# Patient Record
Sex: MALE | Race: WHITE | NOT HISPANIC OR LATINO | URBAN - METROPOLITAN AREA
[De-identification: names, ages, dates, MRNs, and addresses within clinical notes are randomized per-mention and may not be internally consistent; named-entity substitution may affect disease eponyms.]

---

## 2022-02-21 ENCOUNTER — INPATIENT (INPATIENT)
Facility: HOSPITAL | Age: 37
LOS: 3 days | Discharge: HOME | DRG: 897 | End: 2022-02-25
Attending: STUDENT IN AN ORGANIZED HEALTH CARE EDUCATION/TRAINING PROGRAM | Admitting: STUDENT IN AN ORGANIZED HEALTH CARE EDUCATION/TRAINING PROGRAM
Payer: SELF-PAY

## 2022-02-21 VITALS
HEART RATE: 149 BPM | RESPIRATION RATE: 18 BRPM | DIASTOLIC BLOOD PRESSURE: 96 MMHG | SYSTOLIC BLOOD PRESSURE: 159 MMHG | TEMPERATURE: 99 F | WEIGHT: 139.99 LBS | HEIGHT: 69 IN | OXYGEN SATURATION: 98 %

## 2022-02-21 LAB
ALBUMIN SERPL ELPH-MCNC: 4.2 G/DL — SIGNIFICANT CHANGE UP (ref 3.3–5)
ALBUMIN SERPL ELPH-MCNC: 4.2 G/DL — SIGNIFICANT CHANGE UP (ref 3.4–5)
ALP SERPL-CCNC: 280 U/L — HIGH (ref 40–120)
ALP SERPL-CCNC: 346 U/L — HIGH (ref 40–120)
ALT FLD-CCNC: 104 U/L — HIGH (ref 10–45)
ALT FLD-CCNC: 151 U/L — HIGH (ref 12–42)
AMPHET UR-MCNC: NEGATIVE — SIGNIFICANT CHANGE UP
ANION GAP SERPL CALC-SCNC: 16 MMOL/L — SIGNIFICANT CHANGE UP (ref 5–17)
ANION GAP SERPL CALC-SCNC: 24 MMOL/L — HIGH (ref 9–16)
APPEARANCE UR: CLEAR — SIGNIFICANT CHANGE UP
APTT BLD: 33.4 SEC — SIGNIFICANT CHANGE UP (ref 27.5–35.5)
AST SERPL-CCNC: 182 U/L — HIGH (ref 15–37)
AST SERPL-CCNC: 196 U/L — HIGH (ref 10–40)
B-OH-BUTYR SERPL-SCNC: 2.8 MMOL/L — HIGH
BACTERIA # UR AUTO: PRESENT /HPF
BARBITURATES UR SCN-MCNC: NEGATIVE — SIGNIFICANT CHANGE UP
BASOPHILS # BLD AUTO: 0.09 K/UL — SIGNIFICANT CHANGE UP (ref 0–0.2)
BASOPHILS NFR BLD AUTO: 0.5 % — SIGNIFICANT CHANGE UP (ref 0–2)
BENZODIAZ UR-MCNC: NEGATIVE — SIGNIFICANT CHANGE UP
BILIRUB SERPL-MCNC: 1.5 MG/DL — HIGH (ref 0.2–1.2)
BILIRUB SERPL-MCNC: 1.6 MG/DL — HIGH (ref 0.2–1.2)
BILIRUB UR-MCNC: NEGATIVE — SIGNIFICANT CHANGE UP
BUN SERPL-MCNC: 13 MG/DL — SIGNIFICANT CHANGE UP (ref 7–23)
BUN SERPL-MCNC: 8 MG/DL — SIGNIFICANT CHANGE UP (ref 7–23)
CALCIUM SERPL-MCNC: 8.1 MG/DL — LOW (ref 8.4–10.5)
CALCIUM SERPL-MCNC: 9.3 MG/DL — SIGNIFICANT CHANGE UP (ref 8.5–10.5)
CHLORIDE SERPL-SCNC: 94 MMOL/L — LOW (ref 96–108)
CHLORIDE SERPL-SCNC: 96 MMOL/L — SIGNIFICANT CHANGE UP (ref 96–108)
CO2 SERPL-SCNC: 18 MMOL/L — LOW (ref 22–31)
CO2 SERPL-SCNC: 24 MMOL/L — SIGNIFICANT CHANGE UP (ref 22–31)
COCAINE METAB.OTHER UR-MCNC: NEGATIVE — SIGNIFICANT CHANGE UP
COLOR SPEC: YELLOW — SIGNIFICANT CHANGE UP
CREAT SERPL-MCNC: 0.5 MG/DL — SIGNIFICANT CHANGE UP (ref 0.5–1.3)
CREAT SERPL-MCNC: 1 MG/DL — SIGNIFICANT CHANGE UP (ref 0.5–1.3)
DIFF PNL FLD: ABNORMAL
EOSINOPHIL # BLD AUTO: 0 K/UL — SIGNIFICANT CHANGE UP (ref 0–0.5)
EOSINOPHIL NFR BLD AUTO: 0 % — SIGNIFICANT CHANGE UP (ref 0–6)
EPI CELLS # UR: SIGNIFICANT CHANGE UP /HPF (ref 0–5)
ETHANOL SERPL-MCNC: 3 MG/DL — SIGNIFICANT CHANGE UP
GLUCOSE BLDC GLUCOMTR-MCNC: 111 MG/DL — HIGH (ref 70–99)
GLUCOSE SERPL-MCNC: 59 MG/DL — LOW (ref 70–99)
GLUCOSE SERPL-MCNC: 84 MG/DL — SIGNIFICANT CHANGE UP (ref 70–99)
GLUCOSE UR QL: NEGATIVE — SIGNIFICANT CHANGE UP
HCT VFR BLD CALC: 41.5 % — SIGNIFICANT CHANGE UP (ref 39–50)
HGB BLD-MCNC: 14.2 G/DL — SIGNIFICANT CHANGE UP (ref 13–17)
HYALINE CASTS # UR AUTO: ABNORMAL /LPF (ref 0–2)
IMM GRANULOCYTES NFR BLD AUTO: 1.6 % — HIGH (ref 0–1.5)
INR BLD: 1.01 — SIGNIFICANT CHANGE UP (ref 0.88–1.16)
KETONES UR-MCNC: 40 MG/DL
LACTATE SERPL-SCNC: 2.4 MMOL/L — HIGH (ref 0.5–2)
LEUKOCYTE ESTERASE UR-ACNC: NEGATIVE — SIGNIFICANT CHANGE UP
LIDOCAIN IGE QN: 418 U/L — HIGH (ref 73–393)
LYMPHOCYTES # BLD AUTO: 1.28 K/UL — SIGNIFICANT CHANGE UP (ref 1–3.3)
LYMPHOCYTES # BLD AUTO: 7.8 % — LOW (ref 13–44)
MAGNESIUM SERPL-MCNC: 2.2 MG/DL — SIGNIFICANT CHANGE UP (ref 1.6–2.6)
MAGNESIUM SERPL-MCNC: 3 MG/DL — HIGH (ref 1.6–2.6)
MCHC RBC-ENTMCNC: 32.2 PG — SIGNIFICANT CHANGE UP (ref 27–34)
MCHC RBC-ENTMCNC: 34.2 GM/DL — SIGNIFICANT CHANGE UP (ref 32–36)
MCV RBC AUTO: 94.1 FL — SIGNIFICANT CHANGE UP (ref 80–100)
METHADONE UR-MCNC: NEGATIVE — SIGNIFICANT CHANGE UP
MONOCYTES # BLD AUTO: 1.28 K/UL — HIGH (ref 0–0.9)
MONOCYTES NFR BLD AUTO: 7.8 % — SIGNIFICANT CHANGE UP (ref 2–14)
NEUTROPHILS # BLD AUTO: 13.5 K/UL — HIGH (ref 1.8–7.4)
NEUTROPHILS NFR BLD AUTO: 82.3 % — HIGH (ref 43–77)
NITRITE UR-MCNC: NEGATIVE — SIGNIFICANT CHANGE UP
NRBC # BLD: 0 /100 WBCS — SIGNIFICANT CHANGE UP (ref 0–0)
OPIATES UR-MCNC: NEGATIVE — SIGNIFICANT CHANGE UP
PCP SPEC-MCNC: SIGNIFICANT CHANGE UP
PCP UR-MCNC: NEGATIVE — SIGNIFICANT CHANGE UP
PH UR: 6 — SIGNIFICANT CHANGE UP (ref 5–8)
PHOSPHATE SERPL-MCNC: 3.1 MG/DL — SIGNIFICANT CHANGE UP (ref 2.5–4.5)
PLATELET # BLD AUTO: 191 K/UL — SIGNIFICANT CHANGE UP (ref 150–400)
POTASSIUM SERPL-MCNC: 3 MMOL/L — LOW (ref 3.5–5.3)
POTASSIUM SERPL-MCNC: 3.8 MMOL/L — SIGNIFICANT CHANGE UP (ref 3.5–5.3)
POTASSIUM SERPL-SCNC: 3 MMOL/L — LOW (ref 3.5–5.3)
POTASSIUM SERPL-SCNC: 3.8 MMOL/L — SIGNIFICANT CHANGE UP (ref 3.5–5.3)
PROT SERPL-MCNC: 6.2 G/DL — SIGNIFICANT CHANGE UP (ref 6–8.3)
PROT SERPL-MCNC: 8 G/DL — SIGNIFICANT CHANGE UP (ref 6.4–8.2)
PROT UR-MCNC: NEGATIVE MG/DL — SIGNIFICANT CHANGE UP
PROTHROM AB SERPL-ACNC: 11.9 SEC — SIGNIFICANT CHANGE UP (ref 10.6–13.6)
RBC # BLD: 4.41 M/UL — SIGNIFICANT CHANGE UP (ref 4.2–5.8)
RBC # FLD: 19.7 % — HIGH (ref 10.3–14.5)
RBC CASTS # UR COMP ASSIST: > 10 /HPF
SARS-COV-2 RNA SPEC QL NAA+PROBE: SIGNIFICANT CHANGE UP
SODIUM SERPL-SCNC: 136 MMOL/L — SIGNIFICANT CHANGE UP (ref 132–145)
SODIUM SERPL-SCNC: 136 MMOL/L — SIGNIFICANT CHANGE UP (ref 135–145)
SP GR SPEC: 1.02 — SIGNIFICANT CHANGE UP (ref 1–1.03)
THC UR QL: NEGATIVE — SIGNIFICANT CHANGE UP
UROBILINOGEN FLD QL: 0.2 E.U./DL — SIGNIFICANT CHANGE UP
WBC # BLD: 16.42 K/UL — HIGH (ref 3.8–10.5)
WBC # FLD AUTO: 16.42 K/UL — HIGH (ref 3.8–10.5)
WBC UR QL: < 5 /HPF — SIGNIFICANT CHANGE UP

## 2022-02-21 PROCEDURE — 99291 CRITICAL CARE FIRST HOUR: CPT

## 2022-02-21 PROCEDURE — 93010 ELECTROCARDIOGRAM REPORT: CPT

## 2022-02-21 PROCEDURE — 71045 X-RAY EXAM CHEST 1 VIEW: CPT | Mod: 26

## 2022-02-21 PROCEDURE — 99292 CRITICAL CARE ADDL 30 MIN: CPT

## 2022-02-21 PROCEDURE — 99291 CRITICAL CARE FIRST HOUR: CPT | Mod: 25

## 2022-02-21 RX ORDER — FOLIC ACID 0.8 MG
1 TABLET ORAL ONCE
Refills: 0 | Status: COMPLETED | OUTPATIENT
Start: 2022-02-21 | End: 2022-02-21

## 2022-02-21 RX ORDER — SODIUM CHLORIDE 9 MG/ML
1000 INJECTION INTRAMUSCULAR; INTRAVENOUS; SUBCUTANEOUS ONCE
Refills: 0 | Status: COMPLETED | OUTPATIENT
Start: 2022-02-21 | End: 2022-02-21

## 2022-02-21 RX ORDER — POTASSIUM CHLORIDE 20 MEQ
40 PACKET (EA) ORAL ONCE
Refills: 0 | Status: COMPLETED | OUTPATIENT
Start: 2022-02-21 | End: 2022-02-21

## 2022-02-21 RX ORDER — CHLORHEXIDINE GLUCONATE 213 G/1000ML
1 SOLUTION TOPICAL
Refills: 0 | Status: DISCONTINUED | OUTPATIENT
Start: 2022-02-21 | End: 2022-02-22

## 2022-02-21 RX ORDER — FOLIC ACID 0.8 MG
1 TABLET ORAL DAILY
Refills: 0 | Status: DISCONTINUED | OUTPATIENT
Start: 2022-02-22 | End: 2022-02-25

## 2022-02-21 RX ORDER — DIAZEPAM 5 MG
10 TABLET ORAL ONCE
Refills: 0 | Status: DISCONTINUED | OUTPATIENT
Start: 2022-02-21 | End: 2022-02-21

## 2022-02-21 RX ORDER — SODIUM CHLORIDE 9 MG/ML
1000 INJECTION, SOLUTION INTRAVENOUS
Refills: 0 | Status: DISCONTINUED | OUTPATIENT
Start: 2022-02-21 | End: 2022-02-21

## 2022-02-21 RX ORDER — POTASSIUM CHLORIDE 20 MEQ
10 PACKET (EA) ORAL
Refills: 0 | Status: COMPLETED | OUTPATIENT
Start: 2022-02-21 | End: 2022-02-21

## 2022-02-21 RX ORDER — KETOROLAC TROMETHAMINE 30 MG/ML
15 SYRINGE (ML) INJECTION ONCE
Refills: 0 | Status: DISCONTINUED | OUTPATIENT
Start: 2022-02-21 | End: 2022-02-21

## 2022-02-21 RX ORDER — CALCIUM GLUCONATE 100 MG/ML
1 VIAL (ML) INTRAVENOUS ONCE
Refills: 0 | Status: COMPLETED | OUTPATIENT
Start: 2022-02-21 | End: 2022-02-21

## 2022-02-21 RX ORDER — PHENOBARBITAL 60 MG
130 TABLET ORAL
Refills: 0 | Status: DISCONTINUED | OUTPATIENT
Start: 2022-02-21 | End: 2022-02-21

## 2022-02-21 RX ORDER — SODIUM CHLORIDE 9 MG/ML
1000 INJECTION, SOLUTION INTRAVENOUS ONCE
Refills: 0 | Status: COMPLETED | OUTPATIENT
Start: 2022-02-21 | End: 2022-02-21

## 2022-02-21 RX ORDER — THIAMINE MONONITRATE (VIT B1) 100 MG
100 TABLET ORAL ONCE
Refills: 0 | Status: COMPLETED | OUTPATIENT
Start: 2022-02-21 | End: 2022-02-21

## 2022-02-21 RX ORDER — PHENOBARBITAL 60 MG
130 TABLET ORAL ONCE
Refills: 0 | Status: DISCONTINUED | OUTPATIENT
Start: 2022-02-21 | End: 2022-02-21

## 2022-02-21 RX ORDER — PHENOBARBITAL 60 MG
150 TABLET ORAL ONCE
Refills: 0 | Status: DISCONTINUED | OUTPATIENT
Start: 2022-02-21 | End: 2022-02-21

## 2022-02-21 RX ORDER — SODIUM CHLORIDE 9 MG/ML
1000 INJECTION, SOLUTION INTRAVENOUS
Refills: 0 | Status: DISCONTINUED | OUTPATIENT
Start: 2022-02-21 | End: 2022-02-22

## 2022-02-21 RX ORDER — THIAMINE MONONITRATE (VIT B1) 100 MG
500 TABLET ORAL EVERY 8 HOURS
Refills: 0 | Status: COMPLETED | OUTPATIENT
Start: 2022-02-21 | End: 2022-02-24

## 2022-02-21 RX ORDER — PHENOBARBITAL 60 MG
260 TABLET ORAL ONCE
Refills: 0 | Status: DISCONTINUED | OUTPATIENT
Start: 2022-02-21 | End: 2022-02-21

## 2022-02-21 RX ADMIN — SODIUM CHLORIDE 1000 MILLILITER(S): 9 INJECTION, SOLUTION INTRAVENOUS at 13:02

## 2022-02-21 RX ADMIN — Medication 50 MILLIGRAM(S): at 12:51

## 2022-02-21 RX ADMIN — SODIUM CHLORIDE 1000 MILLILITER(S): 9 INJECTION INTRAMUSCULAR; INTRAVENOUS; SUBCUTANEOUS at 11:56

## 2022-02-21 RX ADMIN — SODIUM CHLORIDE 1000 MILLILITER(S): 9 INJECTION, SOLUTION INTRAVENOUS at 12:57

## 2022-02-21 RX ADMIN — Medication 100 MILLIGRAM(S): at 12:00

## 2022-02-21 RX ADMIN — SODIUM CHLORIDE 100 MILLILITER(S): 9 INJECTION, SOLUTION INTRAVENOUS at 16:04

## 2022-02-21 RX ADMIN — Medication 10 MILLIGRAM(S): at 12:15

## 2022-02-21 RX ADMIN — Medication 130 MILLIGRAM(S): at 16:02

## 2022-02-21 RX ADMIN — Medication 150 MILLIGRAM(S): at 23:48

## 2022-02-21 RX ADMIN — Medication 105 MILLIGRAM(S): at 17:58

## 2022-02-21 RX ADMIN — Medication 100 MILLIEQUIVALENT(S): at 22:59

## 2022-02-21 RX ADMIN — Medication 130 MILLIGRAM(S): at 17:00

## 2022-02-21 RX ADMIN — Medication 40 MILLIEQUIVALENT(S): at 23:03

## 2022-02-21 RX ADMIN — Medication 130 MILLIGRAM(S): at 21:46

## 2022-02-21 RX ADMIN — Medication 15 MILLIGRAM(S): at 13:19

## 2022-02-21 RX ADMIN — Medication 100 MILLIEQUIVALENT(S): at 22:03

## 2022-02-21 RX ADMIN — Medication 130 MILLIGRAM(S): at 19:18

## 2022-02-21 RX ADMIN — Medication 10 MILLIGRAM(S): at 13:59

## 2022-02-21 RX ADMIN — Medication 104 MILLIGRAM(S): at 14:01

## 2022-02-21 RX ADMIN — Medication 10 MILLIGRAM(S): at 13:08

## 2022-02-21 RX ADMIN — SODIUM CHLORIDE 1000 MILLILITER(S): 9 INJECTION INTRAMUSCULAR; INTRAVENOUS; SUBCUTANEOUS at 12:02

## 2022-02-21 RX ADMIN — Medication 100 GRAM(S): at 23:54

## 2022-02-21 RX ADMIN — Medication 100 MILLIEQUIVALENT(S): at 20:58

## 2022-02-21 RX ADMIN — Medication 105 MILLIGRAM(S): at 23:03

## 2022-02-21 RX ADMIN — Medication 10 MILLIGRAM(S): at 11:56

## 2022-02-21 RX ADMIN — Medication 1 TABLET(S): at 11:57

## 2022-02-21 RX ADMIN — Medication 10 MILLIGRAM(S): at 12:50

## 2022-02-21 RX ADMIN — SODIUM CHLORIDE 1000 MILLILITER(S): 9 INJECTION INTRAMUSCULAR; INTRAVENOUS; SUBCUTANEOUS at 12:48

## 2022-02-21 RX ADMIN — Medication 1 MILLIGRAM(S): at 11:58

## 2022-02-21 RX ADMIN — SODIUM CHLORIDE 1000 MILLILITER(S): 9 INJECTION INTRAMUSCULAR; INTRAVENOUS; SUBCUTANEOUS at 12:10

## 2022-02-21 NOTE — ED ADULT NURSE NOTE - OBJECTIVE STATEMENT
35 y/o male who was brought in by ambulance for AMS, alcohol withdrawal- Pt states his last drink was 2 days ago and has been walking around the city " lost" pt admits to drinking alcohol everyday about 6-7 vodka and OJ drinks a day with some wine occasionally. Pt arrives with tremors at rest, diaphoretic with visual hallucinations- Pt is awake and alert but disoriented to time- pt presents with no obvious injury or trauma- Bilateral leg swelling noted L>R- 2 Large Bore IV's in place, blood and nasal swab sent- Pt on cardiac monitor and will monitor closely.

## 2022-02-21 NOTE — ED PROVIDER NOTE - CLINICAL SUMMARY MEDICAL DECISION MAKING FREE TEXT BOX
37 y/o M presents to ED with alcohol withdrawal, with initial CIWA of 21.  Pt is very trembulous, diaphoretic.  + sinus tach 140's, 's/90's, afebrile.  Labs are notable for WBC 16, anion gap of 24, transaminitis.  Findings are most consistent with alcohol withdrawal and dehydration.  Pt hydrated with IVFS and given Valium 20 mg IV. 37 y/o M presents to ED with alcohol withdrawal, with initial CIWA of 21.  Pt is very tremulous, diaphoretic.  + sinus tach 140's, 's/90's, afebrile.  Labs are notable for WBC 16, anion gap of 24, transaminitis.  Findings are most consistent with alcohol withdrawal and dehydration.  Pt hydrated with IVFS and given Valium 20 mg IV. 37 y/o M presents to ED with alcohol withdrawal, with initial CIWA of 21.  Pt is very tremulous, diaphoretic.  + sinus tach 140's, 's/90's, afebrile.  Labs are notable for WBC 16, anion gap of 24, transaminitis.  Findings are most consistent with alcohol ketoacidosis and dehydration.  There is no concern for infection at this time.  Pt hydrated with IVFS and given Valium 20 mg IV with improved CIWA 16.

## 2022-02-21 NOTE — H&P ADULT - HISTORY OF PRESENT ILLNESS
37 y/o M, PMH of alcohol abuse (no history of seizures or admissions for WD) presents to ED via EMS c/o tremors and visual hallucinations in the setting of alcohol withdrawal.  He reports seeing things crawling/moving on the floor but he knows they are not there.  He denies headache or tactile disturbances.  He endorses nausea and intermittent vomiting. His last alcoholic drink was approx 24 hours ago (Sunday PM). He reports having experienced tremors in the setting of ETOH withdrawal in the past but never this severe.  He estimates drinking 6-7 Vodka based cocktails daily and sometimes also drinks wine with it, he has drank this much for "years". He denies drug use. Pt denies trauma/falls, fevers/chills, neck or back pain, headache, sore throat, chest pain, palpitations, cough, SOB, abd pain, diarrhea, dysuria, hematuria, weakness, dizziness, numbness, lower extremity swelling, rash, sick contacts, recent hospitalizations.  Pt works as a travel pharmacy tech.   37 y/o M, PMH of alcohol abuse (no history of seizures or admissions for WD) presents to ED via EMS c/o tremors and visual hallucinations in the setting of alcohol withdrawal.  He reports seeing things crawling/moving on the floor but he knows they are not there.  He denies headache or tactile disturbances, although this is no longer occurring upon arrival to [facility].  He endorses nausea and intermittent vomiting. His last alcoholic drink was approx 24 hours ago (Sunday PM). He reports having experienced tremors in the setting of ETOH withdrawal in the past but never this severe.  He estimates drinking 6-7 Vodka based cocktails daily and sometimes also drinks wine with it, he has drank this much for "years". He denies drug use. Pt denies trauma/falls, fevers/chills, neck or back pain, headache, sore throat, chest pain, palpitations, cough, SOB, abd pain, diarrhea, dysuria, hematuria, weakness, dizziness, numbness, lower extremity swelling, rash, sick contacts, recent hospitalizations.  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

## 2022-02-21 NOTE — ED PROVIDER NOTE - CRITICAL CARE ATTENDING CONTRIBUTION TO CARE
The patient was seen immediately upon arrival due to a high probability of imminent or life-threatening deterioration secondary to alcohol withdrawal, requiring IV benzos, regular monitoring, which required my direct attention, intervention, and personal management at the bedside. I have personally provided critical care time exclusive of time spent on separately billable procedures. Time includes review of laboratory data, radiology results, discussion with consultants, discussion with the patient's family, and monitoring for potential decompensation.    Pt presents w severe alcohol withdrawal symptoms, tremors, visual hallucinations. Treated w benzos, fluids aggressively. Consulted MICU for admission, rec to add phenobarb if not significantly improved.

## 2022-02-21 NOTE — ED ADULT TRIAGE NOTE - CHIEF COMPLAINT QUOTE
BIBA for alcohol withdrawals, last drink 2 days ago. reports drinking 4-6 drinks daily (vodka with orange juice). denies HA, N/V, chest pain. h/o alcohol abuse. reports having no history of alcohol withdrawal seizure but has been in withdrawal in the past. +tremulous in triage. HR 130s and FS 64 in the field

## 2022-02-21 NOTE — H&P ADULT - ATTENDING COMMENTS
35 yo M w/ ETOH abuse, hx of tremors with cessation but never required hospital admission for withdrawal, has gone 2-3 days without drinking presented in acute withdrawal (tremors, anxiety, hallucinations, tachycardia, diaphoresis) with CIWA > 20 on presentation to ED. Received valium 10mg IV x 3 and still with CIWA 16 per discussion with ER PA. Decision made to transfer to [facility] ICU for further management of withdrawal. Received additional valium 10mg IV x 2 and librium 50mg oral followed by phenobarbital 260mg IV loading dose in ED prior to transfer. Continue with phenobarbital 130mg q15 min PRN for withdrawal, high dose thiamine, adequately fluid resuscitated at this time. AGMA 2/2 alcoholic ketoacidosis with + BHB. Transaminitis likely related to ETOH, continue to trend.

## 2022-02-21 NOTE — H&P ADULT - NSHPPHYSICALEXAM_GEN_ALL_CORE
VITAL SIGNS:  T(C): 36.7 (02-21-22 @ 14:10), Max: 37.1 (02-21-22 @ 11:30)  T(F): 98 (02-21-22 @ 14:10), Max: 98.7 (02-21-22 @ 11:30)  HR: 111 (02-21-22 @ 14:10) (110 - 149)  BP: 135/87 (02-21-22 @ 14:10) (120/74 - 159/96)  BP(mean): --  RR: 18 (02-21-22 @ 14:10) (18 - 19)  SpO2: 98% (02-21-22 @ 14:10) (97% - 98%)  Wt(kg): --    PHYSICAL EXAM:    Constitutional: nervous young man lying in bed   Head: NC/AT  Eyes: PERRL, EOMI, anicteric sclera  ENT: MMM  Neck: supple; no JVD or thyromegaly  Respiratory: CTA B/L; no W/R/R, no retractions  Cardiac: +S1/S2; RR with tachycardia; no M/R/G; PMI non-displaced  Gastrointestinal: soft, NT/ND; no rebound or guarding; +BSx4  Back: patient complains of MSK pain in the right lower back that comes and goes for months now   Extremities: WWP, no clubbing or cyanosis; no peripheral edema  Vascular: 2+ radial, femoral, DP/PT pulses B/L  Dermatologic: skin warm, dry and intact; no rashes, wounds, or scars  Lymphatic: no submandibular or cervical LAD  Neurologic: AAOx2 (wrong year/month), moves all extremities on command   CIWA: 10-12 for anxiety, tremor and disorientation

## 2022-02-21 NOTE — ED ADULT NURSE NOTE - NSIMPLEMENTINTERV_GEN_ALL_ED
Implemented All Fall with Harm Risk Interventions:  [place] to call system. Call bell, personal items and telephone within reach. Instruct patient to call for assistance. Room bathroom lighting operational. Non-slip footwear when patient is off stretcher. Physically safe environment: no spills, clutter or unnecessary equipment. Stretcher in lowest position, wheels locked, appropriate side rails in place. Provide visual cue, wrist band, yellow gown, etc. Monitor gait and stability. Monitor for mental status changes and reorient to person, place, and time. Review medications for side effects contributing to fall risk. Reinforce activity limits and safety measures with patient and family. Provide visual clues: red socks.

## 2022-02-21 NOTE — H&P ADULT - ASSESSMENT
37 y/o M, PMH of alcohol abuse (no history of seizures or admissions) admitted to the MICU for severe alcohol withdrawal with a CIWA score of >20 on presentation currently on CIWA protocol.     Neuro  #Alcohol WD   - patient got 260mg [facility]bit[facility], 50[facility]   - CIWA protocol, ativan 2mg for CIWA's greater than 8   - Phenobarbital 130mg IV [id number] with a target CIWA less than 8.. avoid benzos during this phase   - c/w high dose thiamine IV 500mg q8hrs (2/21-2/23), followed by PO thiamine to start 2/24   - c/w folate 1g PO daily and multivitamins     Resp  Stable     Cardio  Stable     GI  #Acute alcoholic hepatitis   - Elevated Alk Phos, ALT, AST on admission i/s/o heavy alcohol abuse   - trend as the patient continues CIWA protocol   - Elevated bilirubin on admission     Renal  #AGMA  - likely 2/2 alcoholic induced ketosis, f/u BHB  - trend with fluid restoration, kidney function intact     Heme  Stable     ID  #Leukocytosis   - Patient met SIRS criteria on presentation for a wbc >12K and HR >90, infectios etiology is not suspected   - trend SIRS markers     Misc:  F: D5NS 100cc/hr   E: replete prn   N: NPO  DVT PPX: none    GI PPX: none   Dispo: MICU

## 2022-02-21 NOTE — H&P ADULT - NSHPLABSRESULTS_GEN_ALL_CORE
LABS:                         14.2   16.42 )-----------( 191      ( 2022 11:45 )             41.5         136  |  94<L>  |  13  ----------------------------<  59<L>  3.8   |  18<L>  |  1.00    Ca    9.3      2022 11:45  Mg     3.0         TPro  8.0  /  Alb  4.2  /  TBili  1.6<H>  /  DBili  x   /  AST  182<H>  /  ALT  151<H>  /  AlkPhos  346<H>      PT/INR - ( 2022 11:45 )   PT: 11.9 sec;   INR: 1.01          PTT - ( 2022 11:45 )  PTT:33.4 sec  Urinalysis Basic - ( 2022 13:10 )    Color: Yellow / Appearance: Clear / S.025 / pH: x  Gluc: x / Ketone: 40 mg/dL  / Bili: NEGATIVE / Urobili: 0.2 E.U./dL   Blood: x / Protein: NEGATIVE mg/dL / Nitrite: NEGATIVE   Leuk Esterase: NEGATIVE / RBC: > 10 /HPF / WBC < 5 /HPF   Sq Epi: x / Non Sq Epi: 0-5 /HPF / Bacteria: Present /HPF                RADIOLOGY, EKG & ADDITIONAL TESTS: Reviewed.

## 2022-02-21 NOTE — ED PROVIDER NOTE - PROGRESS NOTE DETAILS
CIWA gradually trending downward.  Now 11.  Pt reports feeling a little better but he remains moderately tremulous and diaphoretic.  HR improved, now 110-120's sinus tach.  Pt given juice for glucose of 59.  Rpt glucose 111.  He has voided with urine continence after IV hydration. Pt given Valium 50 mg total.  Phenobarbital IV loading dose also ordered.  Last CIWA prior to leaving department is 6.

## 2022-02-21 NOTE — PATIENT PROFILE ADULT - FALL HARM RISK - HARM RISK INTERVENTIONS
Assistance with ambulation/Assistance OOB with selected safe patient handling equipment/Communicate Risk of Fall with Harm to all staff/Monitor for mental status changes/Monitor gait and stability/Reinforce activity limits and safety measures with patient and family/Tailored Fall Risk Interventions/Toileting schedule using arm’s reach rule for commode and bathroom/Use of alarms - bed, chair and/or voice tab/Visual Cue: Yellow wristband and red socks/Bed in lowest position, wheels locked, appropriate side rails in place/Call bell, personal items and telephone in reach/Instruct patient to call for assistance before getting out of bed or chair/Non-slip footwear when patient is out of bed/[place] to call system/Physically safe environment - no spills, clutter or unnecessary equipment/Purposeful Proactive Rounding/Room/bathroom lighting operational, light cord in reach

## 2022-02-21 NOTE — ED PROVIDER NOTE - NS ED ROS FT
Constitutional:  no fever, no chills  Eyes:  no discharge, no irritations, no pain, no redness, visual changes  Ears:  no ear pain, no ear drainage,  no hearing problems  Nose:  no nasal congestion, no nasal drainage  Mouth/Throat:  no hoarseness and no throat pain  Neck:  no stiffness, no pain, no lumps, no swollen glands  Cardiac:  no chest pain, no edema  Respiratory:  no cough, no shortness of breath  GI: no abdominal pain, no bloating, no constipation, no diarrhea, + n/v  :  no dysuria, no urinary frequency, no hematuria, no discharge  MSK:  no back pain, no msk pain, no weakness  Neuro:  no headache, no weakness, no numbness, + tremor  Skin:  no lesions, no pruritis, no jaundice, no bruising, no rash  Psych:  no known mental health issues  Endocrine:  no diabetes, no thyroid issues

## 2022-02-21 NOTE — ED PROVIDER NOTE - OBJECTIVE STATEMENT
37 y/o M, PMH of alcohol abuse, PSH appendectomy presents to ED via EMS c/o with c/o tremors and visual hallucinations in the setting of alcohol withdrawal.  His last alcoholic drink was approx less than 2 days ago but pt seems uncertain.  He reports having experienced tremors in the setting of ETOH withdrawal in the past but never this severe.  He estimates drinking 6-7 Vodka based cocktails daily and sometimes also drinks wine with it.  He endorses nausea and vomiting    Pt denies trauma/falls, fevers/chills, neck or back pain, headache, sore throat, chest pain, palpitations, cough, SOB, abd pain, diarrhea, dysuria, hematuria, weakness, dizziness, numbness, lower extremity swelling, rash, sick contacts, recent hospitalizations, recent travels. 37 y/o M, PMH of alcohol abuse, PSH appendectomy presents to ED via EMS c/o with c/o tremors and visual hallucinations in the setting of alcohol withdrawal.  He reports seeing things crawling/moving on the floor but he knows they are not there.  He denies headache or tactile disturbances.  He endorses nausea and intermittent vomiting.  His last alcoholic drink was approx less than 2 days ago but pt seems uncertain.  He reports having experienced tremors in the setting of ETOH withdrawal in the past but never this severe.  He estimates drinking 6-7 Vodka based cocktails daily and sometimes also drinks wine with it. He denies drug use.  He denies any prior ED visits or admissions due to alcohol withdrawal and denies prior withdrawal seizures.    Pt denies trauma/falls, fevers/chills, neck or back pain, headache, sore throat, chest pain, palpitations, cough, SOB, abd pain, diarrhea, dysuria, hematuria, weakness, dizziness, numbness, lower extremity swelling, rash, sick contacts, recent hospitalizations.  Pt works as a travel pharmacy tech.

## 2022-02-21 NOTE — ED PROVIDER NOTE - PHYSICAL EXAMINATION
Constitutional:  Well appearing, disheveled, awake, alert, oriented to person, place, disoriented to time (he believes its June 2022) and in no apparent distress,   Head:  NC/AT, symmetric, neck supple  ENMT: Airway patent, nasal mucosa clear, mouth with normal mucosa, throat has no vesicles, no oropharyngeal exudates and vulva is midline  Eyes:  Clear bilaterally, pupils equal, round and reactive to light  Cardiac:  +tachycardia 140's, regular rhythm. Heart sounds S1,S2.  No murmurs, rubs or gallops.  No JVD.  Respiratory:  Breath sounds clear and equal bilaterally  GI:   Abd soft, non-distended, non-tender, no guarding  MSK:  Spine appears normal, range of motion is not limited, no muscle or joint tenderness  Neuro:  Alert and oriented to person and place, no focal deficits, no motor or sensory deficits  Skin:  Skin appears flushed, warm, , diaphoretic and intact.    Psych:  Normal mood and affect, no apparent risk to self or others.  Heme:  No adenopathy or splenomegaly. Constitutional:  Well appearing, disheveled, awake, alert, oriented to person, place, disoriented to time (he believes its June 2022) and in no apparent distress,   Head:  NC/AT, symmetric, neck supple  ENMT: Airway patent, nasal mucosa clear, mouth with dryl mucosa, throat has no vesicles, no oropharyngeal exudates and vulva is midline  Eyes:  Clear bilaterally, pupils equal, round and reactive to light  Cardiac:  +tachycardia 140's, regular rhythm. Heart sounds S1,S2.  No murmurs, rubs or gallops.  No JVD.  Respiratory:  Breath sounds clear and equal bilaterally  GI:   Abd soft, non-distended, non-tender, no guarding  MSK:  Spine appears normal, range of motion is not limited, no muscle or joint tenderness  Neuro:  Alert and oriented to person and place, disoriented to time, endorses visual disturbances, no tactile disturbances, moderate visible tremor and tongue vesiculation  Skin:  Skin appears flushed, warm, , diaphoretic and intact.    Psych:  Normal mood and affect, no apparent risk to self or others.  Heme:  No adenopathy or splenomegaly.

## 2022-02-22 DIAGNOSIS — F10.239 ALCOHOL DEPENDENCE WITH WITHDRAWAL, UNSPECIFIED: ICD-10-CM

## 2022-02-22 DIAGNOSIS — Z29.9 ENCOUNTER FOR PROPHYLACTIC MEASURES, UNSPECIFIED: ICD-10-CM

## 2022-02-22 DIAGNOSIS — K70.10 ALCOHOLIC HEPATITIS WITHOUT ASCITES: ICD-10-CM

## 2022-02-22 LAB
ALBUMIN SERPL ELPH-MCNC: 3.9 G/DL — SIGNIFICANT CHANGE UP (ref 3.3–5)
ALP SERPL-CCNC: 260 U/L — HIGH (ref 40–120)
ALT FLD-CCNC: 95 U/L — HIGH (ref 10–45)
ANION GAP SERPL CALC-SCNC: 12 MMOL/L — SIGNIFICANT CHANGE UP (ref 5–17)
ANISOCYTOSIS BLD QL: SIGNIFICANT CHANGE UP
AST SERPL-CCNC: 222 U/L — HIGH (ref 10–40)
BASOPHILS # BLD AUTO: 0 K/UL — SIGNIFICANT CHANGE UP (ref 0–0.2)
BASOPHILS NFR BLD AUTO: 0 % — SIGNIFICANT CHANGE UP (ref 0–2)
BILIRUB SERPL-MCNC: 1.7 MG/DL — HIGH (ref 0.2–1.2)
BUN SERPL-MCNC: 5 MG/DL — LOW (ref 7–23)
CALCIUM SERPL-MCNC: 8.3 MG/DL — LOW (ref 8.4–10.5)
CHLORIDE SERPL-SCNC: 100 MMOL/L — SIGNIFICANT CHANGE UP (ref 96–108)
CO2 SERPL-SCNC: 24 MMOL/L — SIGNIFICANT CHANGE UP (ref 22–31)
CREAT SERPL-MCNC: 0.55 MG/DL — SIGNIFICANT CHANGE UP (ref 0.5–1.3)
CULTURE RESULTS: SIGNIFICANT CHANGE UP
EOSINOPHIL # BLD AUTO: 0 K/UL — SIGNIFICANT CHANGE UP (ref 0–0.5)
EOSINOPHIL NFR BLD AUTO: 0 % — SIGNIFICANT CHANGE UP (ref 0–6)
GLUCOSE SERPL-MCNC: 81 MG/DL — SIGNIFICANT CHANGE UP (ref 70–99)
HCT VFR BLD CALC: 36.5 % — LOW (ref 39–50)
HGB BLD-MCNC: 12.6 G/DL — LOW (ref 13–17)
HYPOCHROMIA BLD QL: SLIGHT — SIGNIFICANT CHANGE UP
LACTATE SERPL-SCNC: 1.8 MMOL/L — SIGNIFICANT CHANGE UP (ref 0.5–2)
LYMPHOCYTES # BLD AUTO: 0.3 K/UL — LOW (ref 1–3.3)
LYMPHOCYTES # BLD AUTO: 7.8 % — LOW (ref 13–44)
MACROCYTES BLD QL: SLIGHT — SIGNIFICANT CHANGE UP
MAGNESIUM SERPL-MCNC: 2.1 MG/DL — SIGNIFICANT CHANGE UP (ref 1.6–2.6)
MANUAL SMEAR VERIFICATION: SIGNIFICANT CHANGE UP
MCHC RBC-ENTMCNC: 32.2 PG — SIGNIFICANT CHANGE UP (ref 27–34)
MCHC RBC-ENTMCNC: 34.5 GM/DL — SIGNIFICANT CHANGE UP (ref 32–36)
MCV RBC AUTO: 93.4 FL — SIGNIFICANT CHANGE UP (ref 80–100)
MICROCYTES BLD QL: SLIGHT — SIGNIFICANT CHANGE UP
MONOCYTES # BLD AUTO: 0.17 K/UL — SIGNIFICANT CHANGE UP (ref 0–0.9)
MONOCYTES NFR BLD AUTO: 4.4 % — SIGNIFICANT CHANGE UP (ref 2–14)
NEUTROPHILS # BLD AUTO: 3.41 K/UL — SIGNIFICANT CHANGE UP (ref 1.8–7.4)
NEUTROPHILS NFR BLD AUTO: 87.8 % — HIGH (ref 43–77)
OVALOCYTES BLD QL SMEAR: SLIGHT — SIGNIFICANT CHANGE UP
PHOSPHATE SERPL-MCNC: 1.2 MG/DL — LOW (ref 2.5–4.5)
PLAT MORPH BLD: NORMAL — SIGNIFICANT CHANGE UP
PLATELET # BLD AUTO: 97 K/UL — LOW (ref 150–400)
POIKILOCYTOSIS BLD QL AUTO: SLIGHT — SIGNIFICANT CHANGE UP
POLYCHROMASIA BLD QL SMEAR: SLIGHT — SIGNIFICANT CHANGE UP
POTASSIUM SERPL-MCNC: 3.6 MMOL/L — SIGNIFICANT CHANGE UP (ref 3.5–5.3)
POTASSIUM SERPL-SCNC: 3.6 MMOL/L — SIGNIFICANT CHANGE UP (ref 3.5–5.3)
PROT SERPL-MCNC: 5.9 G/DL — LOW (ref 6–8.3)
RBC # BLD: 3.91 M/UL — LOW (ref 4.2–5.8)
RBC # FLD: 19.9 % — HIGH (ref 10.3–14.5)
RBC BLD AUTO: ABNORMAL
SODIUM SERPL-SCNC: 136 MMOL/L — SIGNIFICANT CHANGE UP (ref 135–145)
SPECIMEN SOURCE: SIGNIFICANT CHANGE UP
SPHEROCYTES BLD QL SMEAR: SLIGHT — SIGNIFICANT CHANGE UP
WBC # BLD: 3.88 K/UL — SIGNIFICANT CHANGE UP (ref 3.8–10.5)
WBC # FLD AUTO: 3.88 K/UL — SIGNIFICANT CHANGE UP (ref 3.8–10.5)

## 2022-02-22 PROCEDURE — 99233 SBSQ HOSP IP/OBS HIGH 50: CPT | Mod: GC

## 2022-02-22 RX ORDER — PHENOBARBITAL 60 MG
150 TABLET ORAL ONCE
Refills: 0 | Status: DISCONTINUED | OUTPATIENT
Start: 2022-02-22 | End: 2022-02-22

## 2022-02-22 RX ORDER — POTASSIUM PHOSPHATE, MONOBASIC POTASSIUM PHOSPHATE, DIBASIC 236; 224 MG/ML; MG/ML
30 INJECTION, SOLUTION INTRAVENOUS ONCE
Refills: 0 | Status: COMPLETED | OUTPATIENT
Start: 2022-02-22 | End: 2022-02-22

## 2022-02-22 RX ORDER — PHENOBARBITAL 60 MG
125 TABLET ORAL ONCE
Refills: 0 | Status: DISCONTINUED | OUTPATIENT
Start: 2022-02-22 | End: 2022-02-22

## 2022-02-22 RX ORDER — POTASSIUM CHLORIDE 20 MEQ
40 PACKET (EA) ORAL ONCE
Refills: 0 | Status: COMPLETED | OUTPATIENT
Start: 2022-02-22 | End: 2022-02-22

## 2022-02-22 RX ADMIN — Medication 25 MILLIGRAM(S): at 11:03

## 2022-02-22 RX ADMIN — Medication 105 MILLIGRAM(S): at 07:33

## 2022-02-22 RX ADMIN — Medication 150 MILLIGRAM(S): at 07:35

## 2022-02-22 RX ADMIN — CHLORHEXIDINE GLUCONATE 1 APPLICATION(S): 213 SOLUTION TOPICAL at 07:33

## 2022-02-22 RX ADMIN — Medication 1 TABLET(S): at 11:03

## 2022-02-22 RX ADMIN — Medication 150 MILLIGRAM(S): at 00:35

## 2022-02-22 RX ADMIN — Medication 1 MILLIGRAM(S): at 11:03

## 2022-02-22 RX ADMIN — Medication 40 MILLIEQUIVALENT(S): at 07:28

## 2022-02-22 RX ADMIN — Medication 105 MILLIGRAM(S): at 18:02

## 2022-02-22 RX ADMIN — Medication 25 MILLIGRAM(S): at 18:02

## 2022-02-22 RX ADMIN — Medication 2 MILLIGRAM(S): at 13:59

## 2022-02-22 RX ADMIN — POTASSIUM PHOSPHATE, MONOBASIC POTASSIUM PHOSPHATE, DIBASIC 83.33 MILLIMOLE(S): 236; 224 INJECTION, SOLUTION INTRAVENOUS at 08:38

## 2022-02-22 NOTE — PROGRESS NOTE ADULT - PROBLEM SELECTOR PLAN 3
F: tolerating PO   E: replete prn   N: regular diet  DVT PPX: none    GI PPX: none   Dispo: 7lach    ***Note, patient wishes to tell his friends/family about his current admission**** F: tolerating PO   E: replete prn   N: regular diet  DVT PPX: none    GI PPX: none   Dispo: 7lach    ***Note, patient wishes not to tell his friends/family about his current admission****

## 2022-02-22 NOTE — PROGRESS NOTE ADULT - SUBJECTIVE AND OBJECTIVE BOX
CC: Patient is a 36y old  Male who presents with a chief complaint of Alc. WD       INTERVAL EVENTS: TAWANNA    SUBJECTIVE / INTERVAL HPI: Patient seen and examined at bedside.     ROS: negative unless otherwise stated above.    VITAL SIGNS:  Vital Signs Last 24 Hrs  T(C): 37 (2022 01:05), Max: 37.1 (2022 11:30)  T(F): 98.6 (2022 01:05), Max: 98.7 (2022 11:30)  HR: 90 (2022 05:00) (74 - 149)  BP: 124/70 (2022 05:00) (95/61 - 159/96)  BP(mean): 90 (2022 05:00) (67 - 107)  RR: 18 (2022 05:00) (13 - 27)  SpO2: 100% (2022 05:00) (92% - 100%)      22 @ 07:01  -  22 @ 06:00  --------------------------------------------------------  IN: 4800 mL / OUT: 1475 mL / NET: 3325 mL        PHYSICAL EXAM:    General: NAD  HEENT: MMM  Neck: supple  Cardiovascular: +S1/S2; RRR  Respiratory: CTA B/L; no W/R/R  Gastrointestinal: soft, NT/ND  Extremities: WWP; no edema, clubbing or cyanosis  Vascular: 2+ radial, DP/PT pulses B/L  Neurological: AAOx3; no focal deficits    MEDICATIONS:  MEDICATIONS  (STANDING):  chlorhexidine 2% Cloths 1 Application(s) Topical <User Schedule>  dextrose 5% + sodium chloride 0.9%. 1000 milliLiter(s) (125 mL/Hr) IV Continuous <Continuous>  folic acid 1 milliGRAM(s) Oral daily  multivitamin 1 Tablet(s) Oral daily  thiamine IVPB 500 milliGRAM(s) IV Intermittent every 8 hours    MEDICATIONS  (PRN):      ALLERGIES:  Allergies    No Known Allergies    Intolerances        LABS:                        14.2   16.42 )-----------( 191      ( 2022 11:45 )             41.5         136  |  96  |  8   ----------------------------<  84  3.0<L>   |  24  |  0.50    Ca    8.1<L>      2022 19:44  Phos  3.1       Mg     2.2         TPro  6.2  /  Alb  4.2  /  TBili  1.5<H>  /  DBili  x   /  AST  196<H>  /  ALT  104<H>  /  AlkPhos  280<H>      PT/INR - ( 2022 11:45 )   PT: 11.9 sec;   INR: 1.01          PTT - ( 2022 11:45 )  PTT:33.4 sec  Urinalysis Basic - ( 2022 13:10 )    Color: Yellow / Appearance: Clear / S.025 / pH: x  Gluc: x / Ketone: 40 mg/dL  / Bili: NEGATIVE / Urobili: 0.2 E.U./dL   Blood: x / Protein: NEGATIVE mg/dL / Nitrite: NEGATIVE   Leuk Esterase: NEGATIVE / RBC: > 10 /HPF / WBC < 5 /HPF   Sq Epi: x / Non Sq Epi: 0-5 /HPF / Bacteria: Present /HPF      CAPILLARY BLOOD GLUCOSE      POCT Blood Glucose.: 111 mg/dL (2022 14:03)      RADIOLOGY & ADDITIONAL TESTS: Reviewed. CC: Patient is a 36y old  Male who presents with a chief complaint of Alc. WD     **STEP DOWN FROM MICU**    HC:  37 y/o M, PMH of alcohol abuse (no history of seizures or admissions for WD) presents to ED via EMS c/o tremors and visual hallucinations in the setting of alcohol withdrawal.  He reports seeing things crawling/moving on the floor but he knows they are not there.  He denies headache or tactile disturbances, although this is no longer occurring upon [facility].  He endorses nausea and intermittent vomiting. His last alcoholic drink was approx ( PM). He reports having experienced tremors in the setting of ETOH withdrawal in the past but never this severe.  He estimates drinking 6-7 Vodka based cocktails daily and sometimes also drinks wine with it, he has drank this much for "years". He denies drug use. He got 260mg phenobarbitol, 50mg librium and 50mg valium at [facility], and since admission to MICU has gotten phenobarb PRN's for CIWA's greater than 8 (6 total doses 670mg total). He passed dysphagia screen and was started on a regular diet this AM, todays CIWA is 8-10 for anxiety, tremor and disorientation. We are switching the IV phenobarb PRN regimen to a standing librium regimen 25mg q8hrs with Ativan PRN's for persistently high CIWA's greater than 8.     INTERVAL EVENTS: TAWANNA    SUBJECTIVE / INTERVAL HPI: Patient seen and examined at bedside.     ROS: negative unless otherwise stated above.    VITAL SIGNS:  Vital Signs Last 24 Hrs  T(C): 37 (2022 01:05), Max: 37.1 (2022 11:30)  T(F): 98.6 (2022 01:05), Max: 98.7 (2022 11:30)  HR: 90 (2022 05:00) (74 - 149)  BP: 124/70 (2022 05:00) (95/61 - 159/96)  BP(mean): 90 (2022 05:00) (67 - 107)  RR: 18 (2022 05:00) (13 - 27)  SpO2: 100% (2022 05:00) (92% - 100%)      22 @ 07:01  -  22 @ 06:00  --------------------------------------------------------  IN: 4800 mL / OUT: 1475 mL / NET: 3325 mL        PHYSICAL EXAM:    General: NAD  HEENT: MMM  Cardiovascular: +S1/S2; RRR  Respiratory: CTA B/L; no W/R/R  Gastrointestinal: soft, NT/ND  Extremities: WWP; no edema, clubbing or cyanosis  Vascular: 2+ radial, DP/PT pulses B/L  Neurological: AAOx3, moves all extremities on command   CIWA: 8-10 for anxiety, tremor and disorientation     MEDICATIONS:  MEDICATIONS  (STANDING):  chlorhexidine 2% Cloths 1 Application(s) Topical <User Schedule>  dextrose 5% + sodium chloride 0.9%. 1000 milliLiter(s) (125 mL/Hr) IV Continuous <Continuous>  folic acid 1 milliGRAM(s) Oral daily  multivitamin 1 Tablet(s) Oral daily  thiamine IVPB 500 milliGRAM(s) IV Intermittent every 8 hours    MEDICATIONS  (PRN):      ALLERGIES:  Allergies    No Known Allergies    Intolerances        LABS:                        14.2   16.42 )-----------( 191      ( 2022 11:45 )             41.5         136  |  96  |  8   ----------------------------<  84  3.0<L>   |  24  |  0.50    Ca    8.1<L>      2022 19:44  Phos  3.1       Mg     2.2         TPro  6.2  /  Alb  4.2  /  TBili  1.5<H>  /  DBili  x   /  AST  196<H>  /  ALT  104<H>  /  AlkPhos  280<H>      PT/INR - ( 2022 11:45 )   PT: 11.9 sec;   INR: 1.01          PTT - ( 2022 11:45 )  PTT:33.4 sec  Urinalysis Basic - ( 2022 13:10 )    Color: Yellow / Appearance: Clear / S.025 / pH: x  Gluc: x / Ketone: 40 mg/dL  / Bili: NEGATIVE / Urobili: 0.2 E.U./dL   Blood: x / Protein: NEGATIVE mg/dL / Nitrite: NEGATIVE   Leuk Esterase: NEGATIVE / RBC: > 10 /HPF / WBC < 5 /HPF   Sq Epi: x / Non Sq Epi: 0-5 /HPF / Bacteria: Present /HPF      CAPILLARY BLOOD GLUCOSE      POCT Blood Glucose.: 111 mg/dL (2022 14:03)      RADIOLOGY & ADDITIONAL TESTS: Reviewed.

## 2022-02-22 NOTE — PROGRESS NOTE ADULT - SUBJECTIVE AND OBJECTIVE BOX
**STEP DOWN FROM MICU TO [facility]**  HOSPITAL COURSE  Mr. Powell is a 35 y/o M w/ PMHx of alcohol abuse (no history of seizures; prior [place]?) presents to ED via EMS c/o tremors and visual hallucinations in the setting of alcohol withdrawal. He reports seeing things crawling/moving on the floor but he knows they are not there. He denies headache or tactile disturbances, although this is no longer occurring upon arrival to [facility]. He endorses nausea and intermittent vomiting. His last alcoholic drink was approx ( PM). He reports having experienced tremors in the setting of ETOH withdrawal in the past but never this severe. He estimates drinking 6-8 vodka-based cocktails daily and sometimes also drinks wine with it, he has drank this much since he was a teenager. He denies drug or tobacco use. He got 260mg phenobarbitol, 50mg librium and 50mg valium at [facility], and since admission to MICU has gotten phenobarb PRN's for CIWA's greater than 8 (6 total doses 670mg total). He passed dysphagia screen and was started on a regular diet  AM; today's CIWA is 8-10 for anxiety, tremor and disorientation. We are switching the IV phenobarb PRN regimen to a standing librium regimen 25mg q8hrs with Ativan PRN's for persistently high CIWA's greater than 8.     INTERVAL EVENTS: NAEO    SUBJECTIVE / INTERVAL HPI: Patient seen and examined at bedside. States that he still feels very tremulous but that he is able to eat without n/v/d. He denies any HA, palpitations, sweating, hallucinations, or peripheral edema.     ROS: negative unless otherwise stated above.    VITAL SIGNS:  Vital Signs Last 24 Hrs  T(C): 37 (2022 01:05), Max: 37.1 (2022 11:30)  T(F): 98.6 (2022 01:05), Max: 98.7 (2022 11:30)  HR: 90 (2022 05:00) (74 - 149)  BP: 124/70 (2022 05:00) (95/61 - 159/96)  BP(mean): 90 (2022 05:00) (67 - 107)  RR: 18 (2022 05:00) (13 - 27)  SpO2: 100% (2022 05:00) (92% - 100%)      22 @ 07:01  -  22 @ 06:00  --------------------------------------------------------  IN: 4800 mL / OUT: 1475 mL / NET: 3325 mL      PHYSICAL EXAM  General: NAD, tearful  HEENT: MMM, minor healing scab over nasal bridge  Cardiovascular: +S1/S2; RRR  Respiratory: CTA B/L; no W/R/R  Gastrointestinal: soft, NT/ND; active bowel sounds x4  Extremities: WWP; no edema, clubbing or cyanosis  Vascular: 2+ radial, DP/PT pulses B/L  Neurological: AAOx3, moves all extremities on command   CIWA: 8-10 for anxiety, tremor and disorientation (unable to provide timeline of events since arriving to the [facility])     MEDICATIONS  (STANDING):  chlordiazePOXIDE 25 milliGRAM(s) Oral every 8 hours  folic acid 1 milliGRAM(s) Oral daily  multivitamin 1 Tablet(s) Oral daily  thiamine IVPB 500 milliGRAM(s) IV Intermittent every 8 hours    MEDICATIONS  (PRN):  LORazepam   Injectable 2 milliGRAM(s) IV Push every 2 hours PRN CIWA >8      ALLERGIES: No Known Allergies    Intolerances        LABS:                                   12.6   3.88  )-----------( 97       ( 2022 05:57 )             36.5   -    136  |  100  |  5<L>  ----------------------------<  81  3.6   |  24  |  0.55    Ca    8.3<L>      2022 05:57  Phos  1.2       Mg     2.1         TPro  5.9<L>  /  Alb  3.9  /  TBili  1.7<H>  /  DBili  x   /  AST  222<H>  /  ALT  95<H>  /  AlkPhos  260<H>  02-22    Lactate, Blood: 1.8 mmol/L  Beta Hydroxy-Butyrate: 2.8 mmoL/L    Urinalysis Basic - ( 2022 13:10 )    Color: Yellow / Appearance: Clear / S.025 / pH: x  Gluc: x / Ketone: 40 mg/dL  / Bili: NEGATIVE / Urobili: 0.2 E.U./dL   Blood: x / Protein: NEGATIVE mg/dL / Nitrite: NEGATIVE   Leuk Esterase: NEGATIVE / RBC: > 10 /HPF / WBC < 5 /HPF   Sq Epi: x / Non Sq Epi: 0-5 /HPF / Bacteria: Present /HPF      RADIOLOGY & ADDITIONAL TESTS: Reviewed.

## 2022-02-22 NOTE — PROGRESS NOTE ADULT - PROBLEM SELECTOR PLAN 1
Pt has a hx of [place] but unable to provide a timeline of when that occurred and what happened during the admission. [place] and asking [place], [place] said he was in [place], and can't recall the today's date (2/22/2022). When asked about prior admissions, he says that he may have been intubated, but can't specifically state if he was in withdrawal or severe intoxication. Patient got 260mg phenobarbitol, 50mg librium and 50mg valium at [facility]. NASIMA 3.  - s/p phenobarb prn's (670mg total)  - current CIWA 10, 2/22     Plan:   - begin librium 25mg q8hrs with 2mg ativan for breakthrough CIWA's greater than 8   - c/w high dose thiamine IV 500mg q8hrs (2/21-2/23), followed by PO thiamine to start 2/24   - c/w folate 1g PO daily and multivitamins

## 2022-02-22 NOTE — PROGRESS NOTE ADULT - PROBLEM SELECTOR PLAN 2
Elevated Alk Phos, ALT, AST on admission i/s/o heavy alcohol abuse. No steroid use, hx or in ICU. Utox neg    Plan:    - trend as the patient continues CIWA protocol   - consider US if RUQ tenderness does not improve  - f/u hep panel

## 2022-02-23 DIAGNOSIS — G31.2 DEGENERATION OF NERVOUS SYSTEM DUE TO ALCOHOL: ICD-10-CM

## 2022-02-23 LAB
ALBUMIN SERPL ELPH-MCNC: 3.7 G/DL — SIGNIFICANT CHANGE UP (ref 3.3–5)
ALP SERPL-CCNC: 257 U/L — HIGH (ref 40–120)
ALT FLD-CCNC: 90 U/L — HIGH (ref 10–45)
ANION GAP SERPL CALC-SCNC: 11 MMOL/L — SIGNIFICANT CHANGE UP (ref 5–17)
AST SERPL-CCNC: 210 U/L — HIGH (ref 10–40)
BASOPHILS # BLD AUTO: 0.02 K/UL — SIGNIFICANT CHANGE UP (ref 0–0.2)
BASOPHILS NFR BLD AUTO: 0.6 % — SIGNIFICANT CHANGE UP (ref 0–2)
BILIRUB SERPL-MCNC: 1.7 MG/DL — HIGH (ref 0.2–1.2)
BUN SERPL-MCNC: 5 MG/DL — LOW (ref 7–23)
CALCIUM SERPL-MCNC: 8.4 MG/DL — SIGNIFICANT CHANGE UP (ref 8.4–10.5)
CHLORIDE SERPL-SCNC: 102 MMOL/L — SIGNIFICANT CHANGE UP (ref 96–108)
CO2 SERPL-SCNC: 24 MMOL/L — SIGNIFICANT CHANGE UP (ref 22–31)
CREAT SERPL-MCNC: 0.54 MG/DL — SIGNIFICANT CHANGE UP (ref 0.5–1.3)
EOSINOPHIL # BLD AUTO: 0.02 K/UL — SIGNIFICANT CHANGE UP (ref 0–0.5)
EOSINOPHIL NFR BLD AUTO: 0.6 % — SIGNIFICANT CHANGE UP (ref 0–6)
GLUCOSE SERPL-MCNC: 92 MG/DL — SIGNIFICANT CHANGE UP (ref 70–99)
HAV IGM SER-ACNC: SIGNIFICANT CHANGE UP
HBV CORE IGM SER-ACNC: SIGNIFICANT CHANGE UP
HBV SURFACE AG SER-ACNC: SIGNIFICANT CHANGE UP
HCT VFR BLD CALC: 39.6 % — SIGNIFICANT CHANGE UP (ref 39–50)
HCV AB S/CO SERPL IA: 0.07 S/CO — SIGNIFICANT CHANGE UP
HCV AB SERPL-IMP: SIGNIFICANT CHANGE UP
HGB BLD-MCNC: 13 G/DL — SIGNIFICANT CHANGE UP (ref 13–17)
IMM GRANULOCYTES NFR BLD AUTO: 0.6 % — SIGNIFICANT CHANGE UP (ref 0–1.5)
LYMPHOCYTES # BLD AUTO: 0.69 K/UL — LOW (ref 1–3.3)
LYMPHOCYTES # BLD AUTO: 20.8 % — SIGNIFICANT CHANGE UP (ref 13–44)
MAGNESIUM SERPL-MCNC: 2.2 MG/DL — SIGNIFICANT CHANGE UP (ref 1.6–2.6)
MCHC RBC-ENTMCNC: 31.7 PG — SIGNIFICANT CHANGE UP (ref 27–34)
MCHC RBC-ENTMCNC: 32.8 GM/DL — SIGNIFICANT CHANGE UP (ref 32–36)
MCV RBC AUTO: 96.6 FL — SIGNIFICANT CHANGE UP (ref 80–100)
MONOCYTES # BLD AUTO: 0.34 K/UL — SIGNIFICANT CHANGE UP (ref 0–0.9)
MONOCYTES NFR BLD AUTO: 10.3 % — SIGNIFICANT CHANGE UP (ref 2–14)
NEUTROPHILS # BLD AUTO: 2.22 K/UL — SIGNIFICANT CHANGE UP (ref 1.8–7.4)
NEUTROPHILS NFR BLD AUTO: 67.1 % — SIGNIFICANT CHANGE UP (ref 43–77)
NRBC # BLD: 0 /100 WBCS — SIGNIFICANT CHANGE UP (ref 0–0)
PHOSPHATE SERPL-MCNC: 2.1 MG/DL — LOW (ref 2.5–4.5)
PLATELET # BLD AUTO: 70 K/UL — LOW (ref 150–400)
POTASSIUM SERPL-MCNC: 3.4 MMOL/L — LOW (ref 3.5–5.3)
POTASSIUM SERPL-SCNC: 3.4 MMOL/L — LOW (ref 3.5–5.3)
PROT SERPL-MCNC: 6 G/DL — SIGNIFICANT CHANGE UP (ref 6–8.3)
RBC # BLD: 4.1 M/UL — LOW (ref 4.2–5.8)
RBC # FLD: 19.8 % — HIGH (ref 10.3–14.5)
SODIUM SERPL-SCNC: 137 MMOL/L — SIGNIFICANT CHANGE UP (ref 135–145)
WBC # BLD: 3.31 K/UL — LOW (ref 3.8–10.5)
WBC # FLD AUTO: 3.31 K/UL — LOW (ref 3.8–10.5)

## 2022-02-23 PROCEDURE — 99233 SBSQ HOSP IP/OBS HIGH 50: CPT | Mod: GC

## 2022-02-23 RX ORDER — ENOXAPARIN SODIUM 100 MG/ML
40 INJECTION SUBCUTANEOUS EVERY 24 HOURS
Refills: 0 | Status: DISCONTINUED | OUTPATIENT
Start: 2022-02-23 | End: 2022-02-23

## 2022-02-23 RX ORDER — POTASSIUM PHOSPHATE, MONOBASIC POTASSIUM PHOSPHATE, DIBASIC 236; 224 MG/ML; MG/ML
30 INJECTION, SOLUTION INTRAVENOUS ONCE
Refills: 0 | Status: COMPLETED | OUTPATIENT
Start: 2022-02-23 | End: 2022-02-23

## 2022-02-23 RX ADMIN — Medication 2 MILLIGRAM(S): at 07:00

## 2022-02-23 RX ADMIN — Medication 105 MILLIGRAM(S): at 03:43

## 2022-02-23 RX ADMIN — Medication 105 MILLIGRAM(S): at 17:59

## 2022-02-23 RX ADMIN — Medication 1 MILLIGRAM(S): at 11:31

## 2022-02-23 RX ADMIN — Medication 2 MILLIGRAM(S): at 22:30

## 2022-02-23 RX ADMIN — Medication 25 MILLIGRAM(S): at 13:10

## 2022-02-23 RX ADMIN — POTASSIUM PHOSPHATE, MONOBASIC POTASSIUM PHOSPHATE, DIBASIC 83.33 MILLIMOLE(S): 236; 224 INJECTION, SOLUTION INTRAVENOUS at 13:09

## 2022-02-23 RX ADMIN — Medication 1 TABLET(S): at 11:30

## 2022-02-23 RX ADMIN — Medication 25 MILLIGRAM(S): at 03:43

## 2022-02-23 RX ADMIN — Medication 105 MILLIGRAM(S): at 11:30

## 2022-02-23 NOTE — PROGRESS NOTE ADULT - PROBLEM SELECTOR PLAN 2
Patient with waxing and waning mental status, at best AAOx2. Aware of situation and reason why he is in the hospital but not always oriented to place and date. Unclear if this is all in the setting of alcohol withdrawal (patient is still within window) or if there is a component of underlying encephalopathy (Wernicke's vs Korsakoff vs alcoholic demyelination).   - will attempt to obtain collateral of baseline mental status from friends (patient is paranoid and is unwilling to provide details of parents' contact info)  - continue withdrawal tx as above

## 2022-02-23 NOTE — PROGRESS NOTE ADULT - SUBJECTIVE AND OBJECTIVE BOX
OVERNIGHT EVENTS: CIWA checks zero x2 overnight     SUBJECTIVE / INTERVAL HPI: Patient seen and examined at bedside. Mildly disoriented upon waking up, read the sign off the board and stated he was in [facility] when asked. AAOx2 to self and time. Slow to answer other questions but does answer appropriately. Endorses mild head discomfort, denies overt headache, chest pain, abdominal pain, tremors, anxiety, tactile/visual/auditory hallucinations, LE swelling.     VITAL SIGNS:  Vital Signs Last 24 Hrs  T(C): 37.2 (23 Feb 2022 10:00), Max: 37.6 (23 Feb 2022 01:51)  T(F): 98.9 (23 Feb 2022 10:00), Max: 99.6 (23 Feb 2022 01:51)  HR: 110 (23 Feb 2022 12:18) (84 - 110)  BP: 117/73 (23 Feb 2022 12:18) (102/61 - 129/78)  BP(mean): 89 (23 Feb 2022 12:18) (76 - 99)  RR: 17 (23 Feb 2022 12:18) (17 - 19)  SpO2: 98% (23 Feb 2022 12:18) (97% - 99%)  I&O's Summary    22 Feb 2022 07:01  -  23 Feb 2022 07:00  --------------------------------------------------------  IN: 249.9 mL / OUT: 1075 mL / NET: -825.1 mL        PHYSICAL EXAM:    General: mildly confused, NAD  HEENT: NC/AT; PERRL, anicteric sclera; MMM  Neck: supple  Cardiovascular: +S1/S2; RRR  Respiratory: CTA B/L; no W/R/R  Gastrointestinal: soft, NT/ND; +BSx4  Extremities: WWP; no edema, clubbing or cyanosis  Vascular: 2+ radial, DP/PT pulses B/L  Neurological: AAOx2 (self, time), no tremors on outstretched arms    MEDICATIONS:  MEDICATIONS  (STANDING):  chlordiazePOXIDE 25 milliGRAM(s) Oral every 12 hours  folic acid 1 milliGRAM(s) Oral daily  multivitamin 1 Tablet(s) Oral daily  thiamine IVPB 500 milliGRAM(s) IV Intermittent every 8 hours    MEDICATIONS  (PRN):  LORazepam   Injectable 2 milliGRAM(s) IV Push every 2 hours PRN CIWA >8      ALLERGIES:  Allergies    No Known Allergies    Intolerances        LABS:                        13.0   3.31  )-----------( 70       ( 23 Feb 2022 05:51 )             39.6     02-23    137  |  102  |  5<L>  ----------------------------<  92  3.4<L>   |  24  |  0.54    Ca    8.4      23 Feb 2022 05:51  Phos  2.1     02-23  Mg     2.2     02-23    TPro  6.0  /  Alb  3.7  /  TBili  1.7<H>  /  DBili  x   /  AST  210<H>  /  ALT  90<H>  /  AlkPhos  257<H>  02-23        CAPILLARY BLOOD GLUCOSE      POCT Blood Glucose.: 111 mg/dL (21 Feb 2022 14:03)      RADIOLOGY & ADDITIONAL TESTS: Reviewed.

## 2022-02-23 NOTE — CHART NOTE - NSCHARTNOTEFT_GEN_A_CORE
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 and returned to work in [place] then [place], then dropped off face of [place] around [place]; Francis lived with him 7 yrs ago ago and then even before then; didn't know he had a drinking problem to the poitn of swollen abdomen until 4 yrs ago; good truong in general  - last spoke with pt late December, works with [employer], as a pharmtech, in [place]  - mental state growing up was fine  - normally knows where he is, is active, knows exact date, was previously fine, introverted  - very stubborn; last time was drinking he denied there was an issue  - everyone tried to help him w/ his life, then recovered, and got a job; otherwise sane person    ********* Charly Powell's (father) - called at 5pm:  - lot of this stuff started back in Dec. 2018, we lost contact with him, we're in [facility] [place] and lost contact with him over gabrielle holiday; his mother and I are  but worked together to try and work out where he was; he was at his apt and we talked to him a little bit at that point in time; he expressed that was trying to figure some things out, and that's why he had not been in touch; and that point in time he became a little more interactive with me, I'm not exactly sure how he interacted with his mom, but prob for a few months after that he did come by my house, helped me with remodeling project, but w/ time became more distant and i did not really hear anything from him until July 2019 when his mother called me that he needed to move out of his apt and if i could help  him move, and when i saw him he looked dangerously ill, feet/legs extremely swollen, could walk ok but you could tell there was discomfort when he walked, decreased strength, abdomen was swollen, rest of him looked very very thin; at that point in time i knew he was in trouble physically and i told his mom that when we moved him out of his apt, before we did anything else we'd take him to local hospital bc there was s/t wrong, we took him there, and basically what we assumed is that he drank excessively and that's what the convenient and ED docs said, that by asking him about his drinking, assumed he was drinking way too much alcohol so they admitted him to hospital and he did have liver damage and needed paracentesis, then in a few weeks would need sofía again, and was on meds to keep fluids from pooling in his abd, and eventually he did not have to have fluid drained anymore, but he always since that time has stayed very thin, and the other thing is that there's something that he his battling mentally, i assume its depression, but there's s/t going on that he's chosen to drink that he's chosen to compensate for to cope; we tried to get him to seek help to deal w/ whatever issues he felt he was trying to deal w/ bc cldn't do it on his own; he didn't convey to me his father that maybe this is difficult to express to family; but he just would not say what things were bothering him and i don't know that he ever sought help for whatever those issues were; don't think he'd ever admitted he was an alcoholic, or sought help for it, he just kind of periodically shuts himself off form everybody; don't think he has any close friends anymore, many of these friends have moved on and he's lost connection w/ that important group of friends, and he just seems to be on his own all the time and he won't let anybody in;   - 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  - we chatted periodically ~1/week, and before he left at end of 11/2021 to go to [place], [place], he stopped by and chatted and said he'd be leaving soon to drive out there and he stopped on the way out there in [place], [place], and [place], and would text me that I have stopped at thse places, and will spend the night here, will get up in AM and go farther; he stopped somewhere close to [place] next night, and would keep me posted about the journey; on 12/5/21 he texted me that he'd arrived in [place], I don't know exactly where he was staying I think was extended stay hotels or motels getting settled in and would start working after a few days, and then I didn't hear anything from him after that until his friend Francis Fatima told me he was in hospital in [place], and I got [facility], your name, and a phone number from his friend Francis; I don't know anything else, I have a time lapse from 12/1/21 through today; at that time in Dec. 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 and returned to work in [place] then [place], then dropped off face of planet around [place]; Francis lived with him 7 yrs ago and then even before then; didn't know he had a drinking problem to the poitn of swollen abdomen until 4 yrs ago; good truong in general  - last spoke with pt late December, works with [employer], as a pharmtech, in [place]  - mental state growing up was fine  - normally knows where he is, is active, knows exact date, was previously fine, introverted  - very stubborn; last time was drinking he denied there was an issue  - everyone tried to help him w/ his life, then recovered, and got a job; otherwise sane person    ********* Charly Powell's (father) - called at 5pm:  - lot of this stuff started back in Dec. 2018, we lost contact with him, we're in [facility] and lost contact with him over gabrielle holiday; his mother and I are  but worked together to try and work out where he was; he was at his apt and we talked to him a little bit at that point in time; he expressed that was trying to figure some things out, and that's why he had not been in touch; and that point in time he became a little more interactive with me, I'm not exactly sure how he interacted with his mom, but prob for a few months after that he did come by my house, helped me with remodeling project, but w/ time became more distant and i did not really hear anything from him until July 2019 when his mother called me that he needed to move out of his apt and if i could help  him move, and when i saw him he looked dangerously ill, feet/legs extremely swollen, could walk ok but you could tell there was discomfort when he walked, decreased strength, abdomen was swollen, rest of him looked very very thin; at that point in time i knew he was in trouble physically and i told his mom that when we moved him out of his apt, before we did anything else we'd take him to local hospital bc there was s/t wrong, we took him there, and basically what we assumed is that he drank excessively and that's what the convenient and ED docs said, that by asking him about his drinking, assumed he was drinking way too much alcohol so they admitted him to hospital and he did have liver damage and needed paracentesis, then in a few weeks would need sofía again, and was on meds to keep fluids from pooling in his abd, and eventually he did not have to have fluid drained anymore, but he always since that time has stayed very thin, and the other thing is that there's something that he his battling mentally, i assume its depression, but there's s/t going on that he's chosen to drink that he's chosen to compensate for to cope; we tried to get him to seek help to deal w/ whatever issues he felt he was trying to deal w/ bc cldn't do it on his own; he didn't convey to me his father that maybe this is difficult to express to family; but he just would not say what things were bothering him and i don't know that he ever sought help for whatever those issues were; don't think he'd ever admitted he was an alcoholic, or sought help for it, he just kind of periodically shuts himself off form everybody; don't think he has any close friends anymore, many of these friends have moved on and he's lost connection w/ that important group of friends, and he just seems to be on his own all the time and he won't let anybody in;   - 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  - we chatted periodically ~1/week, and before he left at end of 11/2021 to go to [place], [place], he stopped by and chatted and said he'd be leaving soon to drive out there and he stopped on the way out there in [place], [place], and [place], and would text me that I have stopped at thse places, and will spend the night here, will get up in AM and go farther; he stopped somewhere close to [place] next night, and would keep me posted about the journey; on 12/5/21 he texted me that he'd arrived in [place], I don't know exactly where he was staying I think was extended stay hotels or motels getting settled in and would start working after a few days, and then I didn't hear anything from him after that until his friend Francis Fatima told me he was in hospital in [place], and I got [facility], your name, and a phone number from his friend Francis; I don't know anything else, I have a time lapse from 12/1/21 through today; at that time in Dec. 2021 he was fine, normal; on his travels from [place] to [place], he was just fine in  the way he contacted me; wouldn't have felt anything was out of the ordinary then; was working jobs 3 months at a time  - no prior history of seizures or intubation  - had liver damage and was supposed to f/u but don't know if he ever went back and had those things done, if he did he didn't tell me or mother  - needs to open up more to people  - always been a quiet, reserved kid; stubborn about others helping him, prefers to be independent Collateral:    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 and returned to work in [place] then [place], then dropped off face of planet around [place]; Francis lived with him 7 yrs ago and then even before then; didn't know he had a drinking problem to the poitn of swollen abdomen until 4 yrs ago; good truong in general  - last spoke with pt late December, works with [employer], as a pharmtech, in [place]  - mental state growing up was fine  - normally knows where he is, is active, knows exact date, was previously fine, introverted  - very stubborn; last time was drinking he denied there was an issue  - everyone tried to help him w/ his life, then recovered, and got a job; otherwise sane person    ********* Charly Powell's (father) - called at 5pm:  - lot of this stuff started back in Dec. 2018, we lost contact with him, we're in [facility] and lost contact with him over fernie holiday; his mother and I are  but worked together to try and work out where he was; he was at his apt and we talked to him a little bit at that point in time; he expressed that was trying to figure some things out, and that's why he had not been in touch; and that point in time he became a little more interactive with me, I'm not exactly sure how he interacted with his mom, but prob for a few months after that he did come by my house, helped me with remodeling project, but w/ time became more distant and i did not really hear anything from him until July 2019 when his mother called me that he needed to move out of his apt and if i could help  him move, and when i saw him he looked dangerously ill, feet/legs extremely swollen, could walk ok but you could tell there was discomfort when he walked, decreased strength, abdomen was swollen, rest of him looked very very thin; at that point in time i knew he was in trouble physically and i told his mom that when we moved him out of his apt, before we did anything else we'd take him to local hospital bc there was s/t wrong, we took him there, and basically what we assumed is that he drank excessively and that's what the convenient and ED docs said, that by asking him about his drinking, assumed he was drinking way too much alcohol so they admitted him to hospital and he did have liver damage and needed paracentesis, then in a few weeks would need sofía again, and was on meds to keep fluids from pooling in his abd, and eventually he did not have to have fluid drained anymore, but he always since that time has stayed very thin, and the other thing is that there's something that he his battling mentally, i assume its depression, but there's s/t going on that he's chosen to drink that he's chosen to compensate for to cope; we tried to get him to seek help to deal w/ whatever issues he felt he was trying to deal w/ bc cldn't do it on his own; he didn't convey to me his father that maybe this is difficult to express to family; but he just would not say what things were bothering him and i don't know that he ever sought help for whatever those issues were; don't think he'd ever admitted he was an alcoholic, or sought help for it, he just kind of periodically shuts himself off form everybody; don't think he has any close friends anymore, many of these friends have moved on and he's lost connection w/ that important group of friends, and he just seems to be on his own all the time and he won't let anybody in;   - 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  - we chatted periodically ~1/week, and before he left at end of 11/2021 to go to [place], [place], he stopped by and chatted and said he'd be leaving soon to drive out there and he stopped on the way out there in [place], [place], and [place], and would text me that I have stopped at these places, and will spend the night here, will get up in AM and go farther; he stopped somewhere close to [place] next night, and would keep me posted about the journey; on 12/5/21 he texted me that he'd arrived in [place], I don't know exactly where he was staying I think was extended stay hotels or motels getting settled in and would start working after a few days, and then I didn't hear anything from him after that until his friend Francis Fatima told me he was in hospital in [place], and I got [facility], your name, and a phone number from his friend Francis; I don't know anything else, I have a time lapse from 12/1/21 through today; at that time in Dec. 2021 he was fine, normal; on his travels from [place] to [place], he was just fine in  the way he contacted me; wouldn't have felt anything was out of the ordinary then; was working jobs 3 months at a time  - no prior history of seizures or intubation  - had liver damage and was supposed to f/u but don't know if he ever went back and had those things done, if he did he didn't tell me or mother  - needs to open up more to people  - always been a quiet, reserved kid; stubborn about others helping him, prefers to be independent\  - possible he's used marijuana, but don't think any of us or his friends would tell you that he has any kind of substance abuse, heroine/cocaine; his choice has always been alcohol; when back in 2018 those doctors would ask him how much he drank, and he told one of them, and he said I know that's not the truth

## 2022-02-23 NOTE — PROGRESS NOTE ADULT - PROBLEM SELECTOR PLAN 1
Pt has a hx of [place]/ [place] but unable to provide a timeline of when that occurred and what happened during the admission. [place] questions, unable to provide how [place], said he was in [place], and can't recall the yesterday's date (2/22/2022). When asked about prior admissions, he says that he may have been intubated, but can't specifically state if he was in withdrawal or severe intoxication. Patient [facility] 260[facility]obar[facility], 50[facility] NASIMA 3. S/p phenobarb prn's (670mg total) in MICU. Started on CIWA protocol cautiously with librium and ativan and stepped down to 7LA for monitoring.  - CIWA score 4 this AM for mild HA, disorientation, and anxiety    Plan:   - weaned librium to 25mg q12hrs with 2mg ativan for breakthrough CIWA's greater than 8   - c/w high dose thiamine IV 500mg q8hrs (2/21-2/23), followed by PO thiamine to start 2/24   - c/w folate 1g PO daily and multivitamins

## 2022-02-24 DIAGNOSIS — G93.40 ENCEPHALOPATHY, UNSPECIFIED: ICD-10-CM

## 2022-02-24 DIAGNOSIS — D69.6 THROMBOCYTOPENIA, UNSPECIFIED: ICD-10-CM

## 2022-02-24 DIAGNOSIS — D72.819 DECREASED WHITE BLOOD CELL COUNT, UNSPECIFIED: ICD-10-CM

## 2022-02-24 DIAGNOSIS — Z87.898 PERSONAL HISTORY OF OTHER SPECIFIED CONDITIONS: ICD-10-CM

## 2022-02-24 LAB
ALBUMIN SERPL ELPH-MCNC: 3.7 G/DL — SIGNIFICANT CHANGE UP (ref 3.3–5)
ALP SERPL-CCNC: 239 U/L — HIGH (ref 40–120)
ALT FLD-CCNC: 79 U/L — HIGH (ref 10–45)
AMPHET UR-MCNC: NEGATIVE — SIGNIFICANT CHANGE UP
ANION GAP SERPL CALC-SCNC: 12 MMOL/L — SIGNIFICANT CHANGE UP (ref 5–17)
AST SERPL-CCNC: 146 U/L — HIGH (ref 10–40)
BARBITURATES UR SCN-MCNC: POSITIVE
BENZODIAZ UR-MCNC: POSITIVE
BILIRUB DIRECT SERPL-MCNC: 0.6 MG/DL — HIGH (ref 0–0.3)
BILIRUB INDIRECT FLD-MCNC: 0.7 MG/DL — SIGNIFICANT CHANGE UP (ref 0.2–1)
BILIRUB SERPL-MCNC: 1.3 MG/DL — HIGH (ref 0.2–1.2)
BUN SERPL-MCNC: 6 MG/DL — LOW (ref 7–23)
CALCIUM SERPL-MCNC: 8.5 MG/DL — SIGNIFICANT CHANGE UP (ref 8.4–10.5)
CHLORIDE SERPL-SCNC: 102 MMOL/L — SIGNIFICANT CHANGE UP (ref 96–108)
CO2 SERPL-SCNC: 22 MMOL/L — SIGNIFICANT CHANGE UP (ref 22–31)
COCAINE METAB.OTHER UR-MCNC: NEGATIVE — SIGNIFICANT CHANGE UP
CREAT SERPL-MCNC: 0.62 MG/DL — SIGNIFICANT CHANGE UP (ref 0.5–1.3)
GLUCOSE SERPL-MCNC: 108 MG/DL — HIGH (ref 70–99)
HCT VFR BLD CALC: 38.7 % — LOW (ref 39–50)
HGB BLD-MCNC: 12.7 G/DL — LOW (ref 13–17)
MAGNESIUM SERPL-MCNC: 2.4 MG/DL — SIGNIFICANT CHANGE UP (ref 1.6–2.6)
MCHC RBC-ENTMCNC: 32.2 PG — SIGNIFICANT CHANGE UP (ref 27–34)
MCHC RBC-ENTMCNC: 32.8 GM/DL — SIGNIFICANT CHANGE UP (ref 32–36)
MCV RBC AUTO: 98 FL — SIGNIFICANT CHANGE UP (ref 80–100)
METHADONE UR-MCNC: NEGATIVE — SIGNIFICANT CHANGE UP
NRBC # BLD: 0 /100 WBCS — SIGNIFICANT CHANGE UP (ref 0–0)
OPIATES UR-MCNC: NEGATIVE — SIGNIFICANT CHANGE UP
PCP SPEC-MCNC: SIGNIFICANT CHANGE UP
PCP UR-MCNC: NEGATIVE — SIGNIFICANT CHANGE UP
PHOSPHATE SERPL-MCNC: 3.2 MG/DL — SIGNIFICANT CHANGE UP (ref 2.5–4.5)
PLATELET # BLD AUTO: 61 K/UL — LOW (ref 150–400)
POTASSIUM SERPL-MCNC: 3.3 MMOL/L — LOW (ref 3.5–5.3)
POTASSIUM SERPL-SCNC: 3.3 MMOL/L — LOW (ref 3.5–5.3)
PROT SERPL-MCNC: 5.6 G/DL — LOW (ref 6–8.3)
RBC # BLD: 3.95 M/UL — LOW (ref 4.2–5.8)
RBC # FLD: 19.9 % — HIGH (ref 10.3–14.5)
SODIUM SERPL-SCNC: 136 MMOL/L — SIGNIFICANT CHANGE UP (ref 135–145)
THC UR QL: NEGATIVE — SIGNIFICANT CHANGE UP
WBC # BLD: 2.83 K/UL — LOW (ref 3.8–10.5)
WBC # FLD AUTO: 2.83 K/UL — LOW (ref 3.8–10.5)

## 2022-02-24 PROCEDURE — 99232 SBSQ HOSP IP/OBS MODERATE 35: CPT | Mod: GC

## 2022-02-24 PROCEDURE — 99223 1ST HOSP IP/OBS HIGH 75: CPT | Mod: GC

## 2022-02-24 PROCEDURE — 99223 1ST HOSP IP/OBS HIGH 75: CPT

## 2022-02-24 PROCEDURE — 93975 VASCULAR STUDY: CPT | Mod: 26

## 2022-02-24 RX ORDER — POTASSIUM CHLORIDE 20 MEQ
40 PACKET (EA) ORAL ONCE
Refills: 0 | Status: COMPLETED | OUTPATIENT
Start: 2022-02-24 | End: 2022-02-24

## 2022-02-24 RX ORDER — THIAMINE MONONITRATE (VIT B1) 100 MG
100 TABLET ORAL DAILY
Refills: 0 | Status: DISCONTINUED | OUTPATIENT
Start: 2022-02-24 | End: 2022-02-25

## 2022-02-24 RX ORDER — PREGABALIN 225 MG/1
1000 CAPSULE ORAL DAILY
Refills: 0 | Status: DISCONTINUED | OUTPATIENT
Start: 2022-02-24 | End: 2022-02-25

## 2022-02-24 RX ADMIN — Medication 25 MILLIGRAM(S): at 08:13

## 2022-02-24 RX ADMIN — Medication 1 MILLIGRAM(S): at 11:11

## 2022-02-24 RX ADMIN — Medication 105 MILLIGRAM(S): at 09:49

## 2022-02-24 RX ADMIN — Medication 105 MILLIGRAM(S): at 04:06

## 2022-02-24 RX ADMIN — Medication 25 MILLIGRAM(S): at 09:49

## 2022-02-24 RX ADMIN — Medication 40 MILLIEQUIVALENT(S): at 08:13

## 2022-02-24 RX ADMIN — Medication 1 TABLET(S): at 11:11

## 2022-02-24 RX ADMIN — Medication 40 MILLIEQUIVALENT(S): at 11:11

## 2022-02-24 NOTE — PROGRESS NOTE ADULT - PROBLEM SELECTOR PLAN 3
Elevated Alk Phos, ALT, AST. No steroid use, hx or in ICU. Utox neg. Collateral from family-->hx of ascites w paracentesis and KALIN. All likely in setting of liver dysfunction 2/2 alcoholism. Hepatitis panel negative. [id number] no abd distension or tenderness.  - trend as the patient continues WA protocol   - f/u RUQ US

## 2022-02-24 NOTE — PROGRESS NOTE ADULT - PROBLEM SELECTOR PLAN 4
Likely 2/2 heavy alcohol use. Initially had drop in plt from 191 to 97, now downtrending. First initial drop in plt likely dilutional. Unclear why plt downtrending. Not on any medications that can cause low plt, no heparin, no hepatitis. Current number (60) may be closer to baseline plt.   - continue to trend  - monitor for signs of bleeding

## 2022-02-24 NOTE — PROGRESS NOTE ADULT - ATTENDING COMMENTS
The patient is more awake.  Is tolerating p.o.  Continue Librium and as needed Ativan.  Collateral discussed from the family.  The patient has underlying liver disorder.    Psych consultPatient seen and examined with house-staff during bedside rounds.  Resident note read, including vitals, physical findings, laboratory data, and radiological reports.   Revisions included below.  Direct personal management at bed side and extensive interpretation of the data.  Plan was outlined and discussed in details with the housestaff.  Decision making of high complexity  Action taken for acute disease activity to reflect the level of care provided:  - medication reconciliation  - review laboratory data
Agree with Dr. Hendrickson History and Physical above, adjustments made in document where necessary. Briefly, patient is chronic alcoholic with frequent relapses who was admitted due to concern for acute withdrawal. He was phenobarb loaded but in the setting of benzo use so unclear if was actually clear of alcohol at the time. Last dose of Librium this morning and then was stopped by our team. When seen at 1800 patient CIWA was 3 for tremor only.     Physical as above, no signs of Hepatic encephalopathy.     Labs and imaging reviewed    Med Rec performed    Problem List:  #EtOH Withdrawal - monitor CIWA, social work setup, anticipate discharge with [place] to return to [place]  #Cognitive dysfunction - likely result of etoh with concern for vitamin deficiency, send RPR, B12, start high dose thiamine  #Gait instability - should have evaluation for DME, would not be good candidate for rehab and currently has family wishing [place] to go to [place]  #Thrombocytopenia - combination of Etoh suppression of bone marrow vs possible cirrhosis with portal HTN and sequestration by spleen. Less concern for destruction, continue to monitor  #Leukopenia - likely related to myelosuppression  #EtOH Hepatitis - RUQ U/S w/doppler to assess cirrhosis     Dispo: Discussed with patient and mother at bedside that patient likely will be discharged Friday/Saturday pending improvement in labs and status. Plan will be for him to return [place] to [place].

## 2022-02-24 NOTE — DIETITIAN INITIAL EVALUATION ADULT. - PERTINENT LABORATORY DATA
potassium 3.3 L, BUN 6 L, Glucose 108 H, total bilirubin 1.3 H, Alkaline phosphatase 239 H,  H, ALT 79 H

## 2022-02-24 NOTE — PROGRESS NOTE ADULT - PROBLEM SELECTOR PLAN 1
Pt has a hx of [place] but unable to provide a timeline of when that occurred and what happened during the admission. [place], [place] to [place], said he was in [place], and can't recall the yesterday's date (2/22/2022). When asked about prior admissions, he says that he may have been intubated, but can't specifically state if he was in withdrawal or severe intoxication. Patient got 260mg phenobarbital, 50mg librium and 50mg valium at [facility]. NASIMA 3. S/p phenobarb prn's (670mg total) in MICU. Started on CIWA protocol cautiously with librium and ativan and stepped down to 7LA for monitoring, now stable for stepdown to RMF  - received 2mg ativan yesterday evening (unclear if CIWA really above 8)  - CIWA 3 yesterday night and later was sleeping comfortable  - librium weaned to 25mg PO daily  - c/w CIWA protocol    Plan:   - C/w librium 25mg daily with 2mg ativan for breakthrough CIWA's greater than 8   - c/w high dose thiamine IV 500mg q8hrs (2/21-2/23), followed by PO thiamine to start 2/24   - c/w folate 1g PO daily and multivitamins Pt has a hx of [place] but unable to provide a timeline of when that occurred and what happened during the admission. [place], [place] to provide [place], said he was in [place], and can't recall the yesterday's date (2/22/2022). When asked about prior admissions, he says that he may have been intubated, but can't specifically state if he was in withdrawal or severe intoxication. Patient [facility] S/p phenobarb prn's (670mg total) in MICU. Started on CIWA protocol cautiously with librium and ativan and stepped down to 7LA for monitoring, now stable for stepdown to RMF  - received 2mg ativan yesterday evening (unclear if CIWA really above 8)  - CIWA 3 yesterday night and later was sleeping comfortable  - librium weaned to 25mg PO daily  - c/w CIWA protocol    Plan:   - C/w librium 25mg daily with 2mg ativan for breakthrough CIWA's greater than 8   - c/w folate 1g PO daily and multivitamins Pt has a hx of [place] but unable to provide a timeline of when that occurred and what happened during the admission. [place], [place] to provide [place], said he was in [place], and can't recall the yesterday's date (2/22/2022). When asked about prior admissions, he says that he may have been intubated, but can't specifically state if he was in withdrawal or severe intoxication. Patient got 260mg phenobarbital, 50mg librium and 50mg valium at [facility]. NASIMA 3. S/p phenobarb prn's (670mg total) in MICU. Started on CIWA protocol cautiously with librium and ativan and stepped down to 7LA for monitoring, now stable for stepdown to RMF  - received 2mg ativan yesterday evening (unclear if CIWA really above 8)  - CIWA 3 yesterday night and later was sleeping comfortable  - librium weaned to 25mg PO daily  - c/w CIWA protocol    Plan:   - Ativan 2 mg IV PRN for breakthrough CIWA's greater than 8   - c/w folate 1g PO daily and multivitamins

## 2022-02-24 NOTE — PROGRESS NOTE ADULT - SUBJECTIVE AND OBJECTIVE BOX
*INCOMPLETE*    INTERVAL HPI/OVERNIGHT EVENTS: ROSEANNE.    SUBJECTIVE: Patient was seen and examined at bedside. Patient resting comfortably. No complaints at this time. Patient denies chills, dizziness, weakness, nausea, vomiting, headaches, chest pain/tightness, palpitations, dyspnea, cough, dysuria, and changes in bowel movements.    VITALS:  T(F): 98 (02-24-22 @ 10:56)  HR: 100 (02-24-22 @ 11:22)  BP: 121/83 (02-24-22 @ 11:22)  RR: 18 (02-24-22 @ 11:22)  SpO2: 99% (02-24-22 @ 11:22)  Wt(kg): --    PHYSICAL EXAM:  General: Awake and comfortable sitting upright in bed  HEENT: no scleral icterus or conjunctival pallor  Neck: supple  Respiratory: No increased work of breathing, CTA, on RA  Cardiovascular: RRR, no murmurs or extra heart sounds  Abdomen: NTND  Extremities: no KALIN, WWP  Neurological: AOx4, moves all extremities spontaneously    MEDICATIONS  (STANDING):  chlordiazePOXIDE 25 milliGRAM(s) Oral <User Schedule>  folic acid 1 milliGRAM(s) Oral daily  multivitamin 1 Tablet(s) Oral daily    MEDICATIONS  (PRN):  LORazepam   Injectable 2 milliGRAM(s) IV Push every 2 hours PRN CIWA >8    Allergies    No Known Allergies    Intolerances      LABS:                        12.7   2.83  )-----------( 61       ( 24 Feb 2022 06:05 )             38.7     02-24    136  |  102  |  6<L>  ----------------------------<  108<H>  3.3<L>   |  22  |  0.62    Ca    8.5      24 Feb 2022 06:05  Phos  3.2     02-24  Mg     2.4     02-24    TPro  5.6<L>  /  Alb  3.7  /  TBili  1.3<H>  /  DBili  0.6<H>  /  AST  146<H>  /  ALT  79<H>  /  AlkPhos  239<H>  02-24        CAPILLARY BLOOD GLUCOSE                     INTERVAL HPI/OVERNIGHT EVENTS: ROSEANNE.    SUBJECTIVE: Patient was seen and examined at bedside. Patient resting comfortably. No complaints at this time. Patient denies anxiety, weakness, nausea, vomiting, dyspnea, cough, dysuria, and changes in bowel movements.    VITALS:  T(F): 98 (02-24-22 @ 10:56)  HR: 100 (02-24-22 @ 11:22)  BP: 121/83 (02-24-22 @ 11:22)  RR: 18 (02-24-22 @ 11:22)  SpO2: 99% (02-24-22 @ 11:22)  Wt(kg): --    PHYSICAL EXAM:  General: Awake and comfortable sitting upright in bed  HEENT: no scleral icterus or conjunctival pallor  Neck: supple  Respiratory: No increased work of breathing, CTA, on RA  Cardiovascular: RRR, no murmurs or extra heart sounds  Abdomen: NTND  Extremities: no KALIN, WWP  Neurological: AOx2 (not to place), moves all extremities spontaneously    MEDICATIONS  (STANDING):  chlordiazePOXIDE 25 milliGRAM(s) Oral <User Schedule>  folic acid 1 milliGRAM(s) Oral daily  multivitamin 1 Tablet(s) Oral daily    MEDICATIONS  (PRN):  LORazepam   Injectable 2 milliGRAM(s) IV Push every 2 hours PRN CIWA >8    Allergies    No Known Allergies    Intolerances      LABS:                        12.7   2.83  )-----------( 61       ( 24 Feb 2022 06:05 )             38.7     02-24    136  |  102  |  6<L>  ----------------------------<  108<H>  3.3<L>   |  22  |  0.62    Ca    8.5      24 Feb 2022 06:05  Phos  3.2     02-24  Mg     2.4     02-24    TPro  5.6<L>  /  Alb  3.7  /  TBili  1.3<H>  /  DBili  0.6<H>  /  AST  146<H>  /  ALT  79<H>  /  AlkPhos  239<H>  02-24        CAPILLARY BLOOD GLUCOSE                     HOSPITAL COURSE: Hospital Course: 35 y/o M, PMH of alcohol abuse 6-8 vodka drinks a day (no history of seizures, but was admitted for withdrawal vs intoxication) and last drink Sunday PM presented to ED via EMS w c/o tremors and visual hallucinations and admitted to MICU for alcohol withdrawal. He initially received standing librium, phenobarbital and diazepam. After being stepped down to 7Lach phenobarb and diazepam switched to prn lorazepam, c/w standing librium. Alcohol withdrawal improved and weaned to minimal standing librium. Course c/b transaminitis likely 2/2 to liver dysfunction i/s/o alcoholism, RUQ ordered. Also has thrombocytopenia, etiology likely alcoholism. No petichiae or signs of bleeding. Collateral from friends and family gotten, has history of LE edema and getting paracentesis i/s/o heavy drinking history. Father believes there may depressive psych component. Psych consulted for depression i/s/o alcoholism and for outpatient f/u. Patient now stable for step down to RMF. Also, he requests that we do not further discuss his case with is family, but can talk with his friends.    INTERVAL HPI/OVERNIGHT EVENTS: No interval events.    SUBJECTIVE: Patient was seen and examined at bedside. Patient resting comfortably. No complaints at this time. Patient denies anxiety, weakness, nausea, vomiting, and anorexia.    VITALS:  T(F): 98 (02-24-22 @ 10:56)  HR: 100 (02-24-22 @ 11:22)  BP: 121/83 (02-24-22 @ 11:22)  RR: 18 (02-24-22 @ 11:22)  SpO2: 99% (02-24-22 @ 11:22)  Wt(kg): --    PHYSICAL EXAM:  General: Awake and comfortable sitting upright in bed  HEENT: no scleral icterus or conjunctival pallor  Neck: supple  Respiratory: No increased work of breathing, CTA, on RA  Cardiovascular: RRR, no murmurs or extra heart sounds  Abdomen: NTND  Extremities: no KALIN, WWP  Neurological: AOx2 (not to place), moves all extremities spontaneously    MEDICATIONS  (STANDING):  chlordiazePOXIDE 25 milliGRAM(s) Oral <User Schedule>  folic acid 1 milliGRAM(s) Oral daily  multivitamin 1 Tablet(s) Oral daily    MEDICATIONS  (PRN):  LORazepam   Injectable 2 milliGRAM(s) IV Push every 2 hours PRN CIWA >8    Allergies    No Known Allergies    Intolerances      LABS:                        12.7   2.83  )-----------( 61       ( 24 Feb 2022 06:05 )             38.7     02-24    136  |  102  |  6<L>  ----------------------------<  108<H>  3.3<L>   |  22  |  0.62    Ca    8.5      24 Feb 2022 06:05  Phos  3.2     02-24  Mg     2.4     02-24    TPro  5.6<L>  /  Alb  3.7  /  TBili  1.3<H>  /  DBili  0.6<H>  /  AST  146<H>  /  ALT  79<H>  /  AlkPhos  239<H>  02-24        CAPILLARY BLOOD GLUCOSE                     HOSPITAL COURSE: Hospital Course: 35 y/o M, PMH of alcohol abuse 6-8 vodka drinks a day (no history of seizures, but was admitted for withdrawal vs intoxication) and last drink Sunday PM presented to ED via EMS w c/o tremors and visual hallucinations and admitted to MICU for alcohol withdrawal. He initially received standing librium, phenobarbital and diazepam. After being stepped down to 7Lach phenobarb and diazepam switched to prn lorazepam, c/w standing librium. Alcohol withdrawal improved and weaned to minimal standing librium. Course c/b transaminitis likely 2/2 to liver dysfunction i/s/o alcoholism, RUQ ordered. Also has thrombocytopenia, etiology likely alcoholism. No petichiae or signs of bleeding. Collateral from friends and family gotten, has history of LE edema and getting paracentesis i/s/o heavy drinking history. Father believes there may depressive psych component. Psych consulted for depression i/s/o alcoholism and for outpatient f/u. Patient now stable for step down to RMF. Also, he requests that we do not further discuss his case with is family, but can talk with his friends.    PMH, PSHx, FH, All, SH and Medications reviewed from H&P, changes below.     INTERVAL HPI/OVERNIGHT EVENTS: No interval events.    SUBJECTIVE: Patient was seen and examined at bedside. Patient resting comfortably. No complaints at this time. Patient denies anxiety, weakness, nausea, vomiting, and anorexia.    VITALS:  T(F): 98 (02-24-22 @ 10:56)  HR: 100 (02-24-22 @ 11:22)  BP: 121/83 (02-24-22 @ 11:22)  RR: 18 (02-24-22 @ 11:22)  SpO2: 99% (02-24-22 @ 11:22)  Wt(kg): --    PHYSICAL EXAM:  General: Awake and comfortable sitting upright in bed  HEENT: no scleral icterus or conjunctival pallor  Neck: supple  Respiratory: No increased work of breathing, CTA, on RA  Cardiovascular: RRR, no murmurs or extra heart sounds  Abdomen: NTND  Extremities: no KALIN, WWP  Neurological: AOx2 (not to place), moves all extremities spontaneously no asterixsis  Vasc: +2 radial and DP b/l  Skin: No rashes, some generalized erythema  Psych: Appropriate Affect, good insight into current addiction    MEDICATIONS  (STANDING):  chlordiazePOXIDE 25 milliGRAM(s) Oral <User Schedule>  folic acid 1 milliGRAM(s) Oral daily  multivitamin 1 Tablet(s) Oral daily    MEDICATIONS  (PRN):  LORazepam   Injectable 2 milliGRAM(s) IV Push every 2 hours PRN CIWA >8    Allergies    No Known Allergies    Intolerances      LABS:                        12.7   2.83  )-----------( 61       ( 24 Feb 2022 06:05 )             38.7     02-24    136  |  102  |  6<L>  ----------------------------<  108<H>  3.3<L>   |  22  |  0.62    Ca    8.5      24 Feb 2022 06:05  Phos  3.2     02-24  Mg     2.4     02-24    TPro  5.6<L>  /  Alb  3.7  /  TBili  1.3<H>  /  DBili  0.6<H>  /  AST  146<H>  /  ALT  79<H>  /  AlkPhos  239<H>  02-24        CAPILLARY BLOOD GLUCOSE              ECG and imaging reviewed

## 2022-02-24 NOTE — PROGRESS NOTE ADULT - PROBLEM SELECTOR PLAN 2
Patient's mental status has improved since yesterday, but if mental status starts to wax and wane again will consider  component of underlying encephalopathy (Wernicke's vs Korsakoff vs alcoholic demyelination).   - continue withdrawal tx as above

## 2022-02-24 NOTE — BH CONSULTATION LIAISON ASSESSMENT NOTE - CASE SUMMARY
37yo M, employed as traveling pharmacy tech, with a history of alcohol use disorder, self-reported h/o liver problems including ascites related to alcohol use, no known h/o other psychiatric diagnosis, who presents with cognitive deficits and tearfulness in the context of hospital admission for management of alcohol withdrawal. Resolving delirium 2/2 alcohol withdrawal is most likely explanation for pt’s tearfulness and impaired concentration, short-term memory deficits; a substance-induced mood d/o is also possible. Some confusion but no obvious confabulation or psychotic sx present. Provided hx, though limited, is not suggestive of prior or recent depression or other acute psychiatric pathology. Strongly encourage engagement in additional treatment for AUD, including MAT and referral to substance rehab (in [place] if pt plans to return home with family). Options briefly d/w pt today but limited in ability to participate. Will remain available as needed.

## 2022-02-24 NOTE — PROGRESS NOTE ADULT - SUBJECTIVE AND OBJECTIVE BOX
Stepdown from [facility] to [facility]    Hospital Course: 37 y/o M, PMH of alcohol abuse 6-8 vodka drinks a day (no history of seizures, but was admitted for withdrawal vs intoxication) and last drink Sunday PM presented to ED via EMS w c/o tremors and visual hallucinations and admitted to MICU for alcohol withdrawal. He initially received standing librium, phenobarbital and diazepam. After being stepped down to 7Lach phenobarb and diazepam switched to prn lorazepam, c/w standing librium. Alcohol withdrawal improved and weaned to minimal standing librium. Course c/b transaminitis likely 2/2 to liver dysfunction i/s/o alcoholism, RUQ ordered. Also has thrombocytopenia, etiology likely alcoholism. No petichiae or signs of bleeding. Collateral from friends and family gotten, has history of LE edema and getting paracentesis i/s/o heavy drinking history. Father believes there may depressive psych component. Psych consulted for depression i/s/o alcoholism and for outpatient f/u. Patient now stable for step down to [facility]. Also, he requests that we do not further discuss his case with is family, but can talk with his friends.    INTERVAL HPI/OVERNIGHT EVENTS: Received 2mg ativan last night per CIWA protocol. Later had CIWA of 3. checked again and patient was sleeping.    SUBJECTIVE: Patient seen and examined at bedside. Patient was AOx4 this morning. He was able to get up and walk around with assistance yesterday. Today has no physical complaints. Discussed getting psych involved in setting of heavy alcohol use and teary disposition yesterday, which he is open to.       VITAL SIGNS:  ICU Vital Signs Last 24 Hrs  T(C): 36.8 (24 Feb 2022 06:02), Max: 37.1 (23 Feb 2022 14:00)  T(F): 98.2 (24 Feb 2022 06:02), Max: 98.7 (23 Feb 2022 14:00)  HR: 90 (24 Feb 2022 08:40) (88 - 110)  BP: 132/73 (24 Feb 2022 08:40) (100/61 - 132/73)  BP(mean): 96 (24 Feb 2022 08:40) (76 - 96)  ABP: --  ABP(mean): --  RR: 20 (24 Feb 2022 08:40) (17 - 22)  SpO2: 99% (24 Feb 2022 08:40) (97% - 100%)      Plateau pressure:   P/F ratio:     02-23 @ 07:01  -  02-24 @ 07:00  --------------------------------------------------------  IN: 700 mL / OUT: 1225 mL / NET: -525 mL      CAPILLARY BLOOD GLUCOSE          PHYSICAL EXAM:    General: Awake and comfortable sitting upright in bed  HEENT: no scleral icterus or conjunctival pallor  Neck: supple  Respiratory: No increased work of breathing, CTA, on RA  Cardiovascular: RRR, no murmurs or extra heart sounds  Abdomen: NTND  Extremities: no KALIN, ABRAHAMP  Neurological: AOx4, moves all extremities spontaneously    MEDICATIONS:  MEDICATIONS  (STANDING):  chlordiazePOXIDE 25 milliGRAM(s) Oral <User Schedule>  folic acid 1 milliGRAM(s) Oral daily  multivitamin 1 Tablet(s) Oral daily  potassium chloride   Powder 40 milliEquivalent(s) Oral once    MEDICATIONS  (PRN):  LORazepam   Injectable 2 milliGRAM(s) IV Push every 2 hours PRN CIWA >8      ALLERGIES:  Allergies    No Known Allergies    Intolerances        LABS:                        12.7   2.83  )-----------( 61       ( 24 Feb 2022 06:05 )             38.7     02-24    136  |  102  |  6<L>  ----------------------------<  108<H>  3.3<L>   |  22  |  0.62    Ca    8.5      24 Feb 2022 06:05  Phos  3.2     02-24  Mg     2.4     02-24    TPro  5.6<L>  /  Alb  3.7  /  TBili  1.3<H>  /  DBili  0.6<H>  /  AST  146<H>  /  ALT  79<H>  /  AlkPhos  239<H>  02-24          RADIOLOGY & ADDITIONAL TESTS: Reviewed.

## 2022-02-24 NOTE — PROGRESS NOTE ADULT - PROBLEM SELECTOR PLAN 3
Elevated Alk Phos, ALT, AST. No steroid use, hx or in ICU. Utox neg. Collateral from family-->hx of ascites w paracentesis and KALIN. All likely in setting of liver dysfunction 2/2 alcoholism. Hepatitis panel negative. [id number] no abd distension or tenderness.  - trend as the patient continues WA protocol   - f/u RUQ US Elevated Alk Phos, ALT, AST. No steroid use, hx or in ICU. Utox neg. Collateral from family-->hx of ascites w paracentesis and KALIN. All likely in setting of liver dysfunction 2/2 alcoholism. Hepatitis panel negative. [id number] no abd distension or tenderness.  - trend as the patient continues CIWA protocol   - f/u RUQ US + Doppler Elevated Alk Phos, ALT, AST. No steroid use, hx or in ICU. Utox neg. Collateral from family-->hx of ascites w paracentesis and KALIN. All likely in setting of liver dysfunction 2/2 alcoholism. Hepatitis panel negative. [id number] no abd distension or tenderness. MADRI 6. No need for GC consult.  - trend as the patient continues [facility] protocol   - f/u RUQ US + Doppler

## 2022-02-24 NOTE — DIETITIAN INITIAL EVALUATION ADULT. - ORAL INTAKE PTA/DIET HISTORY
Spoke with pt in room this morning. States appetite was intact prior admission, eats/ drinks a variety of foods and beverages. Noted hx of ETOH abuse. NKFA, No cultural, ethnic, Sikh food preferences noted

## 2022-02-24 NOTE — BH CONSULTATION LIAISON ASSESSMENT NOTE - NSBHCHARTREVIEWVS_PSY_A_CORE FT
Vital Signs Last 24 Hrs  T(C): 36.6 (24 Feb 2022 12:05), Max: 36.8 (24 Feb 2022 06:02)  T(F): 97.9 (24 Feb 2022 12:05), Max: 98.2 (24 Feb 2022 06:02)  HR: 91 (24 Feb 2022 12:05) (88 - 100)  BP: 123/85 (24 Feb 2022 12:05) (100/61 - 132/73)  BP(mean): 97 (24 Feb 2022 11:22) (76 - 97)  RR: 17 (24 Feb 2022 12:05) (17 - 22)  SpO2: 97% (24 Feb 2022 12:05) (97% - 100%)

## 2022-02-24 NOTE — BH CONSULTATION LIAISON ASSESSMENT NOTE - CURRENT MEDICATION
MEDICATIONS  (STANDING):  cyanocobalamin 1000 MICROGram(s) Oral daily  folic acid 1 milliGRAM(s) Oral daily  multivitamin 1 Tablet(s) Oral daily  thiamine 100 milliGRAM(s) Oral daily    MEDICATIONS  (PRN):  LORazepam   Injectable 2 milliGRAM(s) IV Push every 2 hours PRN CIWA-Ar score 8 or greater

## 2022-02-24 NOTE — BH CONSULTATION LIAISON ASSESSMENT NOTE - SUMMARY
Mr. Powell is a 36-year-old male with PPH of alcohol use disorder and a PMH of liver disease requiring paracentesis who is admitted for the treatment of alcohol withdrawal. Psych consulted for assessment of possible depressive symptoms and to connect to outpatient psych services.     During initial assessment, patient presented as tearful  but denied recent depressive/anxiety symptoms. Demonstrates some insight into alcohol use disorder, but not able to state firm goal or preferred plan for treatment at this time. In the contemplative stage of change. Further encounters should focus on patient's goals of care and exploring any potential mood symptoms.     Patient would benefit from alcohol use disorder treatment specific to his personal goals. Suggest referring to substance use treatment in [place].     RECOMMENDATIONS  - Refer to substance use treatment in home state of [place].   - If outpatient psych provider can be established for follow up, consider initiating naltrexone prior to discharge.  - No psychiatric contraindications to discharge. Psych will continue to follow.

## 2022-02-24 NOTE — BH CONSULTATION LIAISON ASSESSMENT NOTE - NSBHCONSULTFOLLOWAFTERCARE_PSY_A_CORE FT
[facility] Resources:  •	[facility]  o	[email or web address]  o	Walk Ins Mon-Fri 9am-2pm.   o	Two locations  ?	[address] [facility] [id number] [phone]  ?	[address] [place] [address] [phone]  •	[facility]  o	[email or web address]  o	[address] [place] [address]  [phone]

## 2022-02-24 NOTE — BH CONSULTATION LIAISON ASSESSMENT NOTE - NSBHADMITCOUNSELOTHER_PSY_A_CORE FT
emotional support, motivation interviewing, counseling re alcohol use and treatment options  discussion of MAT options for AUD
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
## 2022-02-24 NOTE — DIETITIAN INITIAL EVALUATION ADULT. - FEEDING SKILL
[de-identified] : 78 y/o presents for follow up and fasting labs.\par H/o Ovarian cancer s/p recent debulking/chemo, DM, hyperlipidemia, hypothyroidism, HTN, DVT.\par  independent

## 2022-02-24 NOTE — PROGRESS NOTE ADULT - PROBLEM SELECTOR PLAN 2
Patient's mental status has improved since yesterday, but if mental status starts to wax and wane again will consider  component of underlying encephalopathy (Wernicke's vs Korsakoff vs alcoholic demyelination).   - continue withdrawal tx as above R/O Wernicke encephalopathty  Patient's mental status has improved since yesterday, but if mental status starts to wax and wane again will consider  component of underlying encephalopathy (Wernicke's vs Korsakoff vs alcoholic demyelination).  S/p thiamine IV 500mg q8hrs.  - Thiamine 100 mg PO daily  - Continue withdrawal tx as above Patient's mental status has improved since yesterday, but if mental status starts to wax and wane again will consider  component of underlying encephalopathy (Wernicke's vs Korsakoff vs alcoholic demyelination).  S/p thiamine IV 500mg q8hrs.  - B12  - HIV  - Syphilis  - TSH  - Thiamine 100 mg PO daily

## 2022-02-24 NOTE — DIETITIAN INITIAL EVALUATION ADULT. - OTHER INFO
35 y/o M w/ PMHx of alcohol abuse admitted to the MICU for severe alcohol withdrawal with a CIWA score of >20 on presentation currently on CIWA protocol. Stepped down to 7LACH for continued monitoring in the setting of alcohol withdrawal post-phenobarbital protocol, now stable for step down to RMF. Psych consulted.     GI: +BM 2/24, abd-soft, + normal bowel sounds   Skin: Intact, no pressure injury noted   Mario score: 21  Pain: Denies     Pt states -140# (59-63.6kg) and denies any recent weight changes.

## 2022-02-24 NOTE — PROGRESS NOTE ADULT - TIME BILLING
Patient seen and examined with house-staff during bedside rounds.  Resident note read, including vitals, physical findings, laboratory data, and radiological reports.   Revisions included below.  Direct personal management at bed side and extensive interpretation of the data.  Plan was outlined and discussed in details with the housestaff.  Decision making of high complexity  Action taken for acute disease activity to reflect the level of care provided:  - medication reconciliation  - review laboratory data      Patient is clinically stable.  He is more alert and communicating.  Continue Librium and as needed Ativan.

## 2022-02-24 NOTE — PROGRESS NOTE ADULT - PROBLEM SELECTOR PLAN 1
Pt has a hx of [place] withdrawal w/ [place] in [place] but unable to provide a timeline of when that occurred and what happened during the admission. [place] and [place], unable to provide how [place], said he was in [place], and can't recall the yesterday's date (2/22/2022). When asked about prior admissions, he says that he may have been intubated, but can't specifically state if he was in withdrawal or severe intoxication. Patient [facility] 260[facility], 50mg [facility]. S/p phenobarb prn's (670mg total) in MICU. Started on CIWA protocol cautiously with librium and ativan and stepped down to 7LA for monitoring, now stable for stepdown to RMF  - received 2mg ativan yesterday evening (unclear if CIWA really above 8)  - CIWA 3 yesterday night and later was sleeping comfortable  - librium weaned to 25mg PO daily  - c/w CIWA protocol    Plan:   - weaned librium to 25mg q12hrs with 2mg ativan for breakthrough CIWA's greater than 8   - c/w high dose thiamine IV 500mg q8hrs (2/21-2/23), followed by PO thiamine to start 2/24   - c/w folate 1g PO daily and multivitamins

## 2022-02-24 NOTE — BH CONSULTATION LIAISON ASSESSMENT NOTE - RISK ASSESSMENT
RF: substance use, ?depressive symptoms, male gender  PF: no history of SI/SA, denies current SI, current hospital disposition, relationships to family/friends.  Acute/chronic risk of harm to self/others is LOW.

## 2022-02-24 NOTE — PROGRESS NOTE ADULT - PROBLEM SELECTOR PLAN 5
Hx of heavy EtOH use and has teary disposition. May be underlying MDD. Pt open to the idea of talking with psychiatry  - psych consulted appreciate recs

## 2022-02-25 VITALS
OXYGEN SATURATION: 99 % | RESPIRATION RATE: 18 BRPM | HEART RATE: 90 BPM | DIASTOLIC BLOOD PRESSURE: 81 MMHG | SYSTOLIC BLOOD PRESSURE: 112 MMHG | TEMPERATURE: 98 F

## 2022-02-25 DIAGNOSIS — F10.231 ALCOHOL DEPENDENCE WITH WITHDRAWAL DELIRIUM: ICD-10-CM

## 2022-02-25 LAB
ALBUMIN SERPL ELPH-MCNC: 4.2 G/DL — SIGNIFICANT CHANGE UP (ref 3.3–5)
ALP SERPL-CCNC: 246 U/L — HIGH (ref 40–120)
ALT FLD-CCNC: 79 U/L — HIGH (ref 10–45)
ANION GAP SERPL CALC-SCNC: 12 MMOL/L — SIGNIFICANT CHANGE UP (ref 5–17)
AST SERPL-CCNC: 116 U/L — HIGH (ref 10–40)
BILIRUB SERPL-MCNC: 1.1 MG/DL — SIGNIFICANT CHANGE UP (ref 0.2–1.2)
BLD GP AB SCN SERPL QL: NEGATIVE — SIGNIFICANT CHANGE UP
BUN SERPL-MCNC: 11 MG/DL — SIGNIFICANT CHANGE UP (ref 7–23)
CALCIUM SERPL-MCNC: 9 MG/DL — SIGNIFICANT CHANGE UP (ref 8.4–10.5)
CHLORIDE SERPL-SCNC: 103 MMOL/L — SIGNIFICANT CHANGE UP (ref 96–108)
CLOSURE TME COLL+EPINEP BLD: 83 K/UL — LOW (ref 150–400)
CO2 SERPL-SCNC: 23 MMOL/L — SIGNIFICANT CHANGE UP (ref 22–31)
CREAT SERPL-MCNC: 0.67 MG/DL — SIGNIFICANT CHANGE UP (ref 0.5–1.3)
FOLATE SERPL-MCNC: 9.2 NG/ML — SIGNIFICANT CHANGE UP
GLUCOSE SERPL-MCNC: 97 MG/DL — SIGNIFICANT CHANGE UP (ref 70–99)
HAPTOGLOB SERPL-MCNC: 124 MG/DL — SIGNIFICANT CHANGE UP (ref 34–200)
HCT VFR BLD CALC: 38.8 % — LOW (ref 39–50)
HGB BLD-MCNC: 13.1 G/DL — SIGNIFICANT CHANGE UP (ref 13–17)
HIV 1+2 AB+HIV1 P24 AG SERPL QL IA: SIGNIFICANT CHANGE UP
LDH SERPL L TO P-CCNC: 264 U/L — HIGH (ref 50–242)
MAGNESIUM SERPL-MCNC: 2.3 MG/DL — SIGNIFICANT CHANGE UP (ref 1.6–2.6)
MCHC RBC-ENTMCNC: 33 PG — SIGNIFICANT CHANGE UP (ref 27–34)
MCHC RBC-ENTMCNC: 33.8 GM/DL — SIGNIFICANT CHANGE UP (ref 32–36)
MCV RBC AUTO: 97.7 FL — SIGNIFICANT CHANGE UP (ref 80–100)
NRBC # BLD: 0 /100 WBCS — SIGNIFICANT CHANGE UP (ref 0–0)
PHOSPHATE SERPL-MCNC: 2.5 MG/DL — SIGNIFICANT CHANGE UP (ref 2.5–4.5)
PLATELET # BLD AUTO: 87 K/UL — LOW (ref 150–400)
POTASSIUM SERPL-MCNC: 4.1 MMOL/L — SIGNIFICANT CHANGE UP (ref 3.5–5.3)
POTASSIUM SERPL-SCNC: 4.1 MMOL/L — SIGNIFICANT CHANGE UP (ref 3.5–5.3)
PROT SERPL-MCNC: 6.5 G/DL — SIGNIFICANT CHANGE UP (ref 6–8.3)
RBC # BLD: 3.97 M/UL — LOW (ref 4.2–5.8)
RBC # BLD: 3.97 M/UL — LOW (ref 4.2–5.8)
RBC # FLD: 20.2 % — HIGH (ref 10.3–14.5)
RETICS #: 171.1 K/UL — HIGH (ref 25–125)
RETICS/RBC NFR: 4.3 % — HIGH (ref 0.5–2.5)
RH IG SCN BLD-IMP: POSITIVE — SIGNIFICANT CHANGE UP
SODIUM SERPL-SCNC: 138 MMOL/L — SIGNIFICANT CHANGE UP (ref 135–145)
T PALLIDUM AB TITR SER: NEGATIVE — SIGNIFICANT CHANGE UP
TSH SERPL-MCNC: 1.66 UIU/ML — SIGNIFICANT CHANGE UP (ref 0.27–4.2)
VIT B12 SERPL-MCNC: 502 PG/ML — SIGNIFICANT CHANGE UP (ref 232–1245)
WBC # BLD: 2.95 K/UL — LOW (ref 3.8–10.5)
WBC # FLD AUTO: 2.95 K/UL — LOW (ref 3.8–10.5)

## 2022-02-25 PROCEDURE — 82962 GLUCOSE BLOOD TEST: CPT

## 2022-02-25 PROCEDURE — 86901 BLOOD TYPING SEROLOGIC RH(D): CPT

## 2022-02-25 PROCEDURE — 99233 SBSQ HOSP IP/OBS HIGH 50: CPT

## 2022-02-25 PROCEDURE — 84100 ASSAY OF PHOSPHORUS: CPT

## 2022-02-25 PROCEDURE — 83690 ASSAY OF LIPASE: CPT

## 2022-02-25 PROCEDURE — 83605 ASSAY OF LACTIC ACID: CPT

## 2022-02-25 PROCEDURE — 87086 URINE CULTURE/COLONY COUNT: CPT

## 2022-02-25 PROCEDURE — 85027 COMPLETE CBC AUTOMATED: CPT

## 2022-02-25 PROCEDURE — 80307 DRUG TEST PRSMV CHEM ANLYZR: CPT

## 2022-02-25 PROCEDURE — 80048 BASIC METABOLIC PNL TOTAL CA: CPT

## 2022-02-25 PROCEDURE — 99291 CRITICAL CARE FIRST HOUR: CPT

## 2022-02-25 PROCEDURE — 71045 X-RAY EXAM CHEST 1 VIEW: CPT

## 2022-02-25 PROCEDURE — 85049 AUTOMATED PLATELET COUNT: CPT

## 2022-02-25 PROCEDURE — 80074 ACUTE HEPATITIS PANEL: CPT

## 2022-02-25 PROCEDURE — 82746 ASSAY OF FOLIC ACID SERUM: CPT

## 2022-02-25 PROCEDURE — 36415 COLL VENOUS BLD VENIPUNCTURE: CPT

## 2022-02-25 PROCEDURE — 97161 PT EVAL LOW COMPLEX 20 MIN: CPT

## 2022-02-25 PROCEDURE — 83010 ASSAY OF HAPTOGLOBIN QUANT: CPT

## 2022-02-25 PROCEDURE — 93975 VASCULAR STUDY: CPT

## 2022-02-25 PROCEDURE — 96374 THER/PROPH/DIAG INJ IV PUSH: CPT

## 2022-02-25 PROCEDURE — 82607 VITAMIN B-12: CPT

## 2022-02-25 PROCEDURE — 84443 ASSAY THYROID STIM HORMONE: CPT

## 2022-02-25 PROCEDURE — 80076 HEPATIC FUNCTION PANEL: CPT

## 2022-02-25 PROCEDURE — 96376 TX/PRO/DX INJ SAME DRUG ADON: CPT

## 2022-02-25 PROCEDURE — 96375 TX/PRO/DX INJ NEW DRUG ADDON: CPT

## 2022-02-25 PROCEDURE — 85045 AUTOMATED RETICULOCYTE COUNT: CPT

## 2022-02-25 PROCEDURE — 87635 SARS-COV-2 COVID-19 AMP PRB: CPT

## 2022-02-25 PROCEDURE — 87389 HIV-1 AG W/HIV-1&-2 AB AG IA: CPT

## 2022-02-25 PROCEDURE — 83735 ASSAY OF MAGNESIUM: CPT

## 2022-02-25 PROCEDURE — 86900 BLOOD TYPING SEROLOGIC ABO: CPT

## 2022-02-25 PROCEDURE — 83615 LACTATE (LD) (LDH) ENZYME: CPT

## 2022-02-25 PROCEDURE — 85610 PROTHROMBIN TIME: CPT

## 2022-02-25 PROCEDURE — 85025 COMPLETE CBC W/AUTO DIFF WBC: CPT

## 2022-02-25 PROCEDURE — 82010 KETONE BODYS QUAN: CPT

## 2022-02-25 PROCEDURE — 85730 THROMBOPLASTIN TIME PARTIAL: CPT

## 2022-02-25 PROCEDURE — 86780 TREPONEMA PALLIDUM: CPT

## 2022-02-25 PROCEDURE — 81001 URINALYSIS AUTO W/SCOPE: CPT

## 2022-02-25 PROCEDURE — 86850 RBC ANTIBODY SCREEN: CPT

## 2022-02-25 PROCEDURE — 99239 HOSP IP/OBS DSCHRG MGMT >30: CPT | Mod: GC

## 2022-02-25 PROCEDURE — 80053 COMPREHEN METABOLIC PANEL: CPT

## 2022-02-25 RX ORDER — FOLIC ACID 0.8 MG
1 TABLET ORAL
Qty: 30 | Refills: 0
Start: 2022-02-25 | End: 2022-03-26

## 2022-02-25 RX ORDER — LANOLIN ALCOHOL/MO/W.PET/CERES
5 CREAM (GRAM) TOPICAL ONCE
Refills: 0 | Status: COMPLETED | OUTPATIENT
Start: 2022-02-25 | End: 2022-02-25

## 2022-02-25 RX ORDER — THIAMINE MONONITRATE (VIT B1) 100 MG
1 TABLET ORAL
Qty: 30 | Refills: 0
Start: 2022-02-25 | End: 2022-03-26

## 2022-02-25 RX ORDER — PREGABALIN 225 MG/1
1 CAPSULE ORAL
Qty: 30 | Refills: 0
Start: 2022-02-25 | End: 2022-03-26

## 2022-02-25 RX ADMIN — Medication 1 TABLET(S): at 11:38

## 2022-02-25 RX ADMIN — Medication 100 MILLIGRAM(S): at 11:38

## 2022-02-25 RX ADMIN — Medication 1 MILLIGRAM(S): at 11:38

## 2022-02-25 RX ADMIN — PREGABALIN 1000 MICROGRAM(S): 225 CAPSULE ORAL at 11:38

## 2022-02-25 NOTE — PHYSICAL THERAPY INITIAL EVALUATION ADULT - MODIFIED CLINICAL TEST OF SENSORY INTEGRATION IN BALANCE TEST
Sing leg stance RLE ~1 second, Single leg stance LLE ~1 second; Rhomberg stance eyes closed: 20 seconds with moderate postural sway

## 2022-02-25 NOTE — BH CONSULTATION LIAISON PROGRESS NOTE - NSBHCONSULTRECOMMENDOTHER_PSY_A_CORE FT
Encourage engagement in treatment for alcohol use disorder. options including inpatient and outpt rehab, AA, MAT d/w pt

## 2022-02-25 NOTE — PROGRESS NOTE ADULT - PROBLEM SELECTOR PLAN 5
Hx of heavy EtOH use and has teary disposition. May be underlying MDD. Pt open to the idea of talking with psychiatry  - psych consulted appreciate recs Hx of heavy EtOH use and has teary disposition. May be underlying MDD. Pt open to the idea of talking with psychiatry. Psych consulted and recommend referral to substance use treatment in home [facility]. No psychiatric contraindications to discharge.  - F/u psych recs

## 2022-02-25 NOTE — PHYSICAL THERAPY INITIAL EVALUATION ADULT - GAIT DEVIATIONS NOTED, PT EVAL
pt apprehensive due to impaired balance/decreased israel/increased time in double stance/decreased step length/decreased weight-shifting ability

## 2022-02-25 NOTE — PHYSICAL THERAPY INITIAL EVALUATION ADULT - THERAPY FREQUENCY, PT EVAL
Patient educated on frequency of inpatient physical therapy at [facility], patient verbalized understanding./2-3x/week

## 2022-02-25 NOTE — PROGRESS NOTE ADULT - PROBLEM SELECTOR PLAN 6
F: tolerating PO   E: replete prn   N: regular diet  DVT PPX: none    GI PPX: none   Dispo: step down from 7La to RMF
F: tolerating PO   E: replete prn   N: regular diet  DVT PPX: none    GI PPX: none   Dispo: [facility]
F: tolerating PO   E: replete prn   N: regular diet  DVT PPX: none    GI PPX: none   Dispo: [facility]

## 2022-02-25 NOTE — DISCHARGE NOTE PROVIDER - NSDCFUADDAPPT_GEN_ALL_CORE_FT
After speaking with your mother, you plan on finding a primary care physician in [place]. We recommend establishing care within 2 weeks of discharge. If for any reason you are unable to obtain an appointment with a physician, feel free to call [facility] for a telehealth appointment.
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
## 2022-02-25 NOTE — PROGRESS NOTE ADULT - PROBLEM SELECTOR PLAN 2
Patient's mental status has improved since yesterday, but if mental status starts to wax and wane again will consider  component of underlying encephalopathy (Wernicke's vs Korsakoff vs alcoholic demyelination).  S/p thiamine IV 500mg q8hrs.  - B12  - HIV  - Syphilis  - TSH  - Thiamine 100 mg PO daily Patient's mental status has improved since yesterday, but if mental status starts to wax and wane again will consider  component of underlying encephalopathy (Wernicke's vs Korsakoff vs alcoholic demyelination).  S/p thiamine IV 500mg q8hrs. HIV and syphilis negative.  - B12  - TSH  - Thiamine 100 mg PO daily
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
## 2022-02-25 NOTE — PHYSICAL THERAPY INITIAL EVALUATION ADULT - GENERAL OBSERVATIONS, REHAB EVAL
PT IE completed. Chart reviewed. Pt received seated at EOB, +(R) IV heplock, +RA, NAD. NOREEN Smith cleared pt for PT.

## 2022-02-25 NOTE — PROGRESS NOTE ADULT - ASSESSMENT
This is a 35 y/o M w/ PMHx of alcohol abuse admitted to the MICU for severe alcohol withdrawal with a CIWA score of >20 on presentation currently on CIWA protocol. Stepped down to 7LACH for continued monitoring in the setting of alcohol withdrawal post-phenobarbital protocol.    
35 y/o M, PMH of alcohol abuse (no history of seizures or admissions) admitted to the MICU for severe alcohol withdrawal with a CIWA score of >20 on presentation currently on CIWA protocol.     Neuro  #Alcohol WD   - patient got 260mg phenobarbitol, 50mg librium and 50mg valium at [facility]   - s/p phenobarb prn's (670mg total)  - begin librium 25mg q8hrs with 2mg ativan for breakthrough CIWA's greater than 8   - c/w high dose thiamine IV 500mg q8hrs (2/21-2/23), followed by PO thiamine to start 2/24   - c/w folate 1g PO daily and multivitamins     Resp  Stable     Cardio  Stable     GI  #Acute alcoholic hepatitis   - Elevated Alk Phos, ALT, AST on admission i/s/o heavy alcohol abuse   - trend as the patient continues CIWA protocol   - consider US if does not improve     Renal  #AGMA - RESOLVED   - likely 2/2 alcoholic induced ketosis, elevated BHB  - trend with fluid restoration, kidney function intact     Heme  Stable     ID  #Leukocytosis - RESOLVED   - Patient met SIRS criteria on presentation for a wbc >12K and HR >90, infectious etiology is not suspected   - trend SIRS markers     Misc:  F: tolerating PO   E: replete prn   N: NPO  DVT PPX: none    GI PPX: none   Dispo: MICU 
This is a 37 y/o M w/ PMHx of alcohol abuse admitted to the MICU for severe alcohol withdrawal with a CIWA score of >20 on presentation currently on CIWA protocol. Stepped down to 7LACH for continued monitoring in the setting of alcohol withdrawal post-phenobarbital protocol, now stable for step down to RMF.    
This is a 37 y/o M w/ PMHx of alcohol abuse admitted to the MICU for severe alcohol withdrawal with a CIWA score of >20 on presentation currently on CIWA protocol. Stepped down to 7LACH for continued monitoring in the setting of alcohol withdrawal.     
This is a 37 y/o M w/ PMHx of alcohol abuse admitted to the MICU for severe alcohol withdrawal with a CIWA score of >20 on presentation currently on CIWA protocol. Stepped down to 7LACH for continued monitoring in the setting of alcohol withdrawal post-phenobarbital protocol, now stable for step down to RMF.    
This is a 35 y/o M w/ PMHx of alcohol abuse admitted to the MICU for severe alcohol withdrawal with a CIWA score of >20 on presentation currently on CIWA protocol. Stepped down to 7LACH for continued monitoring in the setting of alcohol withdrawal post-phenobarbital protocol, now stable for step down to RMF.

## 2022-02-25 NOTE — BH CONSULTATION LIAISON PROGRESS NOTE - NSBHADMITCOUNSELOTHER_PSY_A_CORE FT
extensive discussion re: alcohol use and treatment options, risk factor reduction, alternatives to AA

## 2022-02-25 NOTE — PROGRESS NOTE ADULT - PROBLEM SELECTOR PLAN 4
#Leukopenia  Likely 2/2 heavy alcohol use. Initially had drop in plt from 191 to 97, now downtrending. First initial drop in plt likely dilutional. Unclear why plt downtrending. Not on any medications that can cause low plt, no heparin, no hepatitis. Current number (60) may be closer to baseline plt. Presented with leukocytosis to 16 but now downtrended to 2.8. Hepatitis panel wnl.  - Blue top  - Hemolysis labs  - HIV  - continue to trend  - monitor for signs of bleeding #Leukopenia  Likely 2/2 heavy alcohol use. Initially had drop in plt from 191 to 97, now downtrending. First initial drop in plt likely dilutional. Unclear why plt downtrending. Not on any medications that can cause low plt, no heparin, no hepatitis. Current number (60) may be closer to baseline plt. Presented with leukocytosis to 16 but now downtrended to 2.8. Hepatitis panel and hemolysis labs wnl. HIV negative.  - continue to trend  - monitor for signs of bleeding

## 2022-02-25 NOTE — BH CONSULTATION LIAISON PROGRESS NOTE - NSBHCONSULTFOLLOWAFTERCARE_PSY_A_CORE FT
Encourage participation in treatment for alcohol use disorder. Pt plans to return to [place] - encouraged to seek rehab program; appreciate  assistance if pt agreeable. Local [place] resources previously provided

## 2022-02-25 NOTE — DISCHARGE NOTE PROVIDER - HOSPITAL COURSE
#Discharge: do not delete    Patient is __ yo M/F with past medical history of _____ presented with _____, found to have _____ (one liner)    Hospital course (by problem):     Patient was discharged to: (home/[facility]/acute rehab/hospice, etc, and with what services – home health PT/RN? Home O2?)    New medications:   Changes to old medications:  Medications that were stopped:    Items to follow up as outpatient:    Physical exam at the time of discharge:       #Discharge: do not delete    Patient is 37 yo M with PMH ETOH abuse (no sz/DTs) admitted for alcohol withdrawal after presenting with tremors and visual hallucinations.    Hospital course (by problem):   #Alcohol withdrawal  Hx of alcohol withdrawal requiring hospital admissions. No hx of seizures or intubations. Presented with required MICU stay where he was given phenobarbital, librium, and valium. Stepped down to telemetry on CIWA protocol with librium and ativan, then stepped down to RMF and taken off librium and no longer required Ativan PRNs. CIWA 0-1 on discharge. Started on folate 1g PO daily and multivitamins. Discharged home with close PCP follow up.    #Encephalopathy  Presented with AMS and cognitive deficits most likely associated with alcohol withdrawal. Given IV thiamine. HIV, syphilis, and TSH wnl.  - Thiamine 100 mg PO daily    #Hepatic steatosis  #R/O Cirrhosis  #Alcoholic hepatitis  Elevated Alk Phos to 200s, AST:ALT > 2:1 (100-200s/70-90s). Utox neg. Per family, has hx of ascites requiring paracentesis. All likely in setting of liver dysfunction 2/2 alcohol use. Hepatitis panel negative. No abdominal distention or tenderness on exam. MADRI 6 so no need for steroids. RUQ Duplex US w/o evidence of portal HTN but revealed hepatic steatosis.    #Thrombocytopenia  #Leukopenia  Drop in plt from 191 to 60-90s during the admission. Presented with leukocytosis to 16 but now downtrended to 2s. No s/s of bleeding. Hemolysis labs, hepatitis panel, and HIV wnl. Not on any medications that can cause low plt, no heparin. Likely 2/2 heavy alcohol use.     #Depressed Mood  Hx of heavy EtOH use and has teary disposition. Psychiatry evaluated patient and recommend referral to substance use treatment in home [facility]. No psychiatric contraindications to discharge.    Patient was discharged to: home    New medications: None  Changes to old medications: None  Medications that were stopped: None    Items to follow up as outpatient:  Alcohol cessation - f/u PCP    Physical exam at the time of discharge:  Gen: NAD, laying in bed, well appearing, alert, interactive  HEENT: MMM, anicteric sclera, no JVD, no cervical or supraclavicular LAD  Cardio: +S1/S2, RRR, no murmurs  Resp: breathing comfortably, speaking in full sentences, CTA b/l, no w/r/r  GI: soft, +BS x4, NT/ND  Ext: no peripheral edema, NROM x4  Vasc: 2+ radial, DP, and PT pulses  Skin: warm, dry, and intact. No rashes, wounds or scars  Neuro: AAOx3, CN II-XII intact, no focal neurologic deficits, no tremor           #Discharge: do not delete    Patient is 35 yo M with PMH ETOH abuse (no sz/DTs) admitted for alcohol withdrawal after presenting with tremors and visual hallucinations.    Hospital course (by problem):   #Alcohol withdrawal  Hx of alcohol withdrawal requiring hospital admissions. No hx of seizures or intubations. Presented with required MICU stay where he was given phenobarbital, librium, and valium. Stepped down to telemetry on CIWA protocol with librium and ativan, then stepped down to RMF and taken off librium and no longer required Ativan PRNs. CIWA 0-1 on discharge. Started on folate 1g PO daily and multivitamins. Psychiatry consulted and strongly encourage engagement in additional treatment for AUD, including MAT and referral to substance rehab. Discharged home with close PCP follow up.    #Encephalopathy  Presented with AMS and cognitive deficits most likely associated with alcohol withdrawal. Given IV thiamine. HIV, syphilis, and TSH wnl.  - Thiamine 100 mg PO daily    #Hepatic steatosis  #R/O Cirrhosis  #Alcoholic hepatitis  Elevated Alk Phos to 200s, AST:ALT > 2:1 (100-200s/70-90s). Utox neg. Per family, has hx of ascites requiring paracentesis. All likely in setting of liver dysfunction 2/2 alcohol use. Hepatitis panel negative. No abdominal distention or tenderness on exam. MADRI 6 so no need for steroids. RUQ Duplex US w/o evidence of portal HTN but revealed hepatic steatosis.    #Thrombocytopenia  #Leukopenia  Drop in plt from 191 to 60-90s during the admission. Presented with leukocytosis to 16 but now downtrended to 2s. No s/s of bleeding. Hemolysis labs, hepatitis panel, and HIV wnl. Not on any medications that can cause low plt, no heparin. Likely 2/2 heavy alcohol use.     #Depressed Mood  Hx of heavy EtOH use and has teary disposition. Psychiatry evaluated patient and recommend referral to substance use treatment in home [facility]. No psychiatric contraindications to discharge.    Patient was discharged to: home    New medications: None  Changes to old medications: None  Medications that were stopped: None    Items to follow up as outpatient:  Alcohol cessation - f/u PCP    Physical exam at the time of discharge:  Gen: NAD, laying in bed, well appearing, alert, interactive  HEENT: MMM, anicteric sclera, no JVD, no cervical or supraclavicular LAD  Cardio: +S1/S2, RRR, no murmurs  Resp: breathing comfortably, speaking in full sentences, CTA b/l, no w/r/r  GI: soft, +BS x4, NT/ND  Ext: no peripheral edema, NROM x4  Vasc: 2+ radial, DP, and PT pulses  Skin: warm, dry, and intact. No rashes, wounds or scars  Neuro: AAOx3, CN II-XII intact, no focal neurologic deficits, no tremor           #Discharge: do not delete    Patient is 35 yo M with PMH ETOH abuse (no sz/DTs) admitted for alcohol withdrawal after presenting with tremors and visual hallucinations.    Hospital course (by problem):   #Alcohol withdrawal  Hx of alcohol withdrawal requiring hospital admissions. No hx of seizures or intubations. Presented with required MICU stay where he was given phenobarbital, librium, and valium. Stepped down to telemetry on CIWA protocol with librium and ativan, then stepped down to RMF and taken off librium and no longer required Ativan PRNs. CIWA 0-1 on discharge. Started on folate 1g PO daily and multivitamins. Psychiatry consulted and strongly encourage engagement in additional treatment for AUD, including MAT and referral to substance rehab. Discharged home with close PCP follow up.    #Encephalopathy  Presented with AMS and cognitive deficits most likely associated with alcohol withdrawal. Given IV thiamine. HIV, syphilis, and TSH wnl.  - Thiamine 100 mg PO daily    #Hepatic steatosis  #R/O Cirrhosis  #Alcoholic hepatitis  Elevated Alk Phos to 200s, AST:ALT > 2:1 (100-200s/70-90s). Utox neg. Per family, has hx of ascites requiring paracentesis. All likely in setting of liver dysfunction 2/2 alcohol use. Hepatitis panel negative. No abdominal distention or tenderness on exam. MADRI 6 so no need for steroids. RUQ Duplex US w/o evidence of portal HTN but revealed hepatic steatosis.    #Thrombocytopenia  #Leukopenia  Drop in plt from 191 to 60-90s during the admission. Presented with leukocytosis to 16 but now downtrended to 2s. No s/s of bleeding. Hemolysis labs, hepatitis panel, and HIV wnl. Not on any medications that can cause low plt, no heparin. Likely 2/2 heavy alcohol use.     #Depressed Mood  Hx of heavy EtOH use and has teary disposition. Psychiatry evaluated patient and recommend referral to substance use treatment in home [facility]. No psychiatric contraindications to discharge.    Patient was discharged to: home    New medications: MV, folate, B12, thiamine  Changes to old medications: None  Medications that were stopped: None    Items to follow up as outpatient:  Alcohol cessation - f/u PCP    Physical exam at the time of discharge:  Gen: NAD, laying in bed, well appearing, alert, interactive  HEENT: MMM, anicteric sclera, no JVD, no cervical or supraclavicular LAD  Cardio: +S1/S2, RRR, no murmurs  Resp: breathing comfortably, speaking in full sentences, CTA b/l, no w/r/r  GI: soft, +BS x4, NT/ND  Ext: no peripheral edema, NROM x4  Vasc: 2+ radial, DP, and PT pulses  Skin: warm, dry, and intact. No rashes, wounds or scars  Neuro: AAOx3, CN II-XII intact, no focal neurologic deficits, no tremor

## 2022-02-25 NOTE — PROGRESS NOTE ADULT - PROBLEM SELECTOR PLAN 3
Elevated Alk Phos, ALT, AST. No steroid use, hx or in ICU. Utox neg. Collateral from family-->hx of ascites w paracentesis and KALIN. All likely in setting of liver dysfunction 2/2 alcoholism. Hepatitis panel negative. [id number] no abd distension or tenderness. MADRI 6. No need for GC consult.  - trend as the patient continues [facility] protocol   - f/u RUQ US + Doppler Elevated Alk Phos, ALT, AST. No steroid use, hx or in ICU. Utox neg. Collateral from family-->hx of ascites w paracentesis and KALIN. All likely in setting of liver dysfunction 2/2 alcoholism. Hepatitis panel negative. [id number] no abd distension or tenderness. MADRI 6. No need for GC consult. Preliminary RUQ US + Doppler read w/o evidence of portal HTN and hepatic steatosis.  - Trend LFTs

## 2022-02-25 NOTE — PHYSICAL THERAPY INITIAL EVALUATION ADULT - PERTINENT HX OF CURRENT PROBLEM, REHAB EVAL
This is a 37 y/o M w/ PMHx of alcohol abuse admitted to the MICU for severe alcohol withdrawal with a CIWA score of >20 on presentation currently on CIWA protocol. Stepped down to 7LACH for continued monitoring in the setting of alcohol withdrawal post-phenobarbital protocol, now stable for step down to RMF.

## 2022-02-25 NOTE — BH CONSULTATION LIAISON PROGRESS NOTE - NSBHASSESSMENTFT_PSY_ALL_CORE
35yo M, employed as traveling pharmacy tech, with a history of alcohol use disorder, self-reported h/o liver problems including ascites related to alcohol use, no known h/o other psychiatric diagnosis, who presents with improving cognitive and mood sx (prior tearfulness) suggestive of resolving alcohol withdrawal delirium. History and recent pattern of alcohol use, with related health complications, is consistent with a long-standing alcohol use disorder; pt denies any prior engagement in AUD treatment and is currently contemplative re: treatment, some ambivalence about formal programs. Options including rehab programs and medication assisted treatment discussed. No sx suggestive of a major depressive episode or other decompensated psychiatric pathology. No suicidality or acute safety concerns.    RECOMMENDATIONS  -no need for psychiatric admission or standing psychotropic medication  -substance counseling provided and MI employed  -consider initiation of naltrexone for AUD if pt able to establish f/u care. indication and r/b/a reviewed with pt including need for monitoring of LFTs  -appreciate SW assistance in discussion of rehab options if pt agreeable (note plan to return to [place]). Local options discussed  -no psychiatric barrier to discharge. Please contact with questions

## 2022-02-25 NOTE — PROGRESS NOTE ADULT - SUBJECTIVE AND OBJECTIVE BOX
INTERVAL HPI/OVERNIGHT EVENTS: CIWA 1 overnight. Did not require Ativan 2 mg IVP.    SUBJECTIVE: Patient was seen and examined at bedside. Patient resting comfortably. No complaints at this time. Patient denies anxiety, weakness, nausea, vomiting, and anorexia.    VITALS:  T(F): 98.2 (02-25-22 @ 05:06)  HR: 70 (02-25-22 @ 05:06)  BP: 104/70 (02-25-22 @ 05:06)  RR: 19 (02-25-22 @ 05:06)  SpO2: 96% (02-25-22 @ 05:06)  Wt(kg): --    PHYSICAL EXAM:  Gen: NAD, laying in bed, well appearing, alert, interactive  HEENT: MMM, anicteric sclera, no JVD, no cervical or supraclavicular LAD  Cardio: +S1/S2, RRR, no murmurs  Resp: breathing comfortably, speaking in full sentences, CTA b/l, no w/r/r  GI: soft, +BS x4, NT/ND  Ext: no peripheral edema, NROM x4  Vasc: 2+ radial, DP, and PT pulses  Skin: warm, dry, and intact. No rashes, wounds or scars  Neuro: AAOx3, CN II-XII intact, no focal neurologic deficits    MEDICATIONS  (STANDING):  cyanocobalamin 1000 MICROGram(s) Oral daily  folic acid 1 milliGRAM(s) Oral daily  multivitamin 1 Tablet(s) Oral daily  thiamine 100 milliGRAM(s) Oral daily    MEDICATIONS  (PRN):  LORazepam   Injectable 2 milliGRAM(s) IV Push every 2 hours PRN CIWA-Ar score 8 or greater    Allergies    No Known Allergies    Intolerances      LABS:                        12.7   2.83  )-----------( 61       ( 24 Feb 2022 06:05 )             38.7     02-24    136  |  102  |  6<L>  ----------------------------<  108<H>  3.3<L>   |  22  |  0.62    Ca    8.5      24 Feb 2022 06:05  Phos  3.2     02-24  Mg     2.4     02-24    TPro  5.6<L>  /  Alb  3.7  /  TBili  1.3<H>  /  DBili  0.6<H>  /  AST  146<H>  /  ALT  79<H>  /  AlkPhos  239<H>  02-24        CAPILLARY BLOOD GLUCOSE                     INTERVAL HPI/OVERNIGHT EVENTS: CIWA 1 overnight. Did not require Ativan PRN.    SUBJECTIVE: Patient was seen and examined at bedside. Patient resting comfortably. Patient is tearful and is worried about getting back lost property. Patient denies anxiety, weakness, nausea, vomiting, and anorexia.     VITALS:  T(F): 98.2 (02-25-22 @ 05:06)  HR: 70 (02-25-22 @ 05:06)  BP: 104/70 (02-25-22 @ 05:06)  RR: 19 (02-25-22 @ 05:06)  SpO2: 96% (02-25-22 @ 05:06)  Wt(kg): --    PHYSICAL EXAM:  Gen: NAD, laying in bed, well appearing, alert, interactive  HEENT: MMM, anicteric sclera, no JVD, no cervical or supraclavicular LAD  Cardio: +S1/S2, RRR, no murmurs  Resp: breathing comfortably, speaking in full sentences, CTA b/l, no w/r/r  GI: soft, +BS x4, NT/ND  Ext: no peripheral edema, NROM x4  Vasc: 2+ radial, DP, and PT pulses  Skin: warm, dry, and intact. No rashes, wounds or scars  Neuro: AAOx3, CN II-XII intact, no focal neurologic deficits  Psych: tearful, sad affect    MEDICATIONS  (STANDING):  cyanocobalamin 1000 MICROGram(s) Oral daily  folic acid 1 milliGRAM(s) Oral daily  multivitamin 1 Tablet(s) Oral daily  thiamine 100 milliGRAM(s) Oral daily    MEDICATIONS  (PRN):  LORazepam   Injectable 2 milliGRAM(s) IV Push every 2 hours PRN CIWA-Ar score 8 or greater    Allergies    No Known Allergies    Intolerances      LABS:                        12.7   2.83  )-----------( 61       ( 24 Feb 2022 06:05 )             38.7     02-24    136  |  102  |  6<L>  ----------------------------<  108<H>  3.3<L>   |  22  |  0.62    Ca    8.5      24 Feb 2022 06:05  Phos  3.2     02-24  Mg     2.4     02-24    TPro  5.6<L>  /  Alb  3.7  /  TBili  1.3<H>  /  DBili  0.6<H>  /  AST  146<H>  /  ALT  79<H>  /  AlkPhos  239<H>  02-24        CAPILLARY BLOOD GLUCOSE

## 2022-02-25 NOTE — DISCHARGE NOTE PROVIDER - ATTENDING DISCHARGE PHYSICAL EXAMINATION:
Gen: NAD, laying in bed, well appearing, alert, interactive  HEENT: MMM, anicteric sclera, no JVD, no cervical or supraclavicular LAD  Cardio: +S1/S2, RRR, no murmurs  Resp: breathing comfortably, speaking in full sentences, CTA b/l, no w/r/r  GI: soft, +BS x4, NT/ND  Ext: no peripheral edema, NROM x4  Vasc: 2+ radial, DP, and PT pulses  Skin: warm, dry, and intact. No rashes, wounds or scars  Neuro: AAOx3, CN II-XII intact, no focal neurologic deficits, slight tremor but improved from ystd  Psych: appropriate affect

## 2022-02-25 NOTE — BH CONSULTATION LIAISON PROGRESS NOTE - NSBHCHARTREVIEWVS_PSY_A_CORE FT
Vital Signs Last 24 Hrs  T(C): 36.6 (25 Feb 2022 13:03), Max: 37.1 (24 Feb 2022 20:10)  T(F): 97.9 (25 Feb 2022 13:03), Max: 98.7 (24 Feb 2022 20:10)  HR: 90 (25 Feb 2022 13:03) (70 - 110)  BP: 112/81 (25 Feb 2022 13:03) (104/70 - 130/79)  BP(mean): --  RR: 18 (25 Feb 2022 13:03) (18 - 19)  SpO2: 99% (25 Feb 2022 13:03) (96% - 99%)

## 2022-02-25 NOTE — DISCHARGE NOTE PROVIDER - NSFOLLOWUPCLINICS_GEN_ALL_ED_FT
[facility]  Family Medicine  [address]  [place], [place] [address]  Phone: [phone]  Fax:

## 2022-02-25 NOTE — BH CONSULTATION LIAISON PROGRESS NOTE - CURRENT MEDICATION
MEDICATIONS  (STANDING):  cyanocobalamin 1000 MICROGram(s) Oral daily  folic acid 1 milliGRAM(s) Oral daily  multivitamin 1 Tablet(s) Oral daily  thiamine 100 milliGRAM(s) Oral daily    MEDICATIONS  (PRN):

## 2022-02-25 NOTE — PROGRESS NOTE ADULT - PROBLEM SELECTOR PROBLEM 3
Prophylactic measure
Acute alcoholic hepatitis

## 2022-02-25 NOTE — BH CONSULTATION LIAISON PROGRESS NOTE - NSBHCHARTREVIEWLAB_PSY_A_CORE FT
13.1   2.95  )-----------( 87       ( 25 Feb 2022 08:15 )             38.8     02-25    138  |  103  |  11  ----------------------------<  97  4.1   |  23  |  0.67    Ca    9.0      25 Feb 2022 08:15  Phos  2.5     02-25  Mg     2.3     02-25    TPro  6.5  /  Alb  4.2  /  TBili  1.1  /  DBili  x   /  AST  116<H>  /  ALT  79<H>  /  AlkPhos  246<H>  02-25

## 2022-02-25 NOTE — DISCHARGE NOTE NURSING/CASE MANAGEMENT/SOCIAL WORK - PATIENT PORTAL LINK FT
You can access the FollowMyHealth Patient Portal offered by [facility] by registering at the following website: [email or web address]. By joining [employer]’s FollowMyHealth portal, you will also be able to view your health information using other applications (apps) compatible with our system.

## 2022-02-25 NOTE — DISCHARGE NOTE PROVIDER - NSDCMRMEDTOKEN_GEN_ALL_CORE_FT
cyanocobalamin 1000 mcg oral tablet: 1 tab(s) orally once a day  folic acid 1 mg oral tablet: 1 tab(s) orally once a day  Multiple Vitamins oral tablet: 1 tab(s) orally once a day  thiamine 100 mg oral tablet: 1 tab(s) orally once a day

## 2022-02-25 NOTE — DISCHARGE NOTE PROVIDER - NSDCCPCAREPLAN_GEN_ALL_CORE_FT
PRINCIPAL DISCHARGE DIAGNOSIS  Diagnosis: Alcohol withdrawal  Assessment and Plan of Treatment: You were admitted to our institution for alcohol withdrawal after presenting with tremors and visual hallucinations. We managed your withdrawal with intravenous and oral medications which have been tapered off. We recommend that you attend an alcohol cessation program to prevent recurrence of this condition. Please follow up with your primary care provider in the next 2 weeks. If you experiencing increasing tremors, visual hallucinations, anxiety or palpitations, please return to the emergency room.       PRINCIPAL DISCHARGE DIAGNOSIS  Diagnosis: Alcohol withdrawal  Assessment and Plan of Treatment: You were admitted to our institution for alcohol withdrawal after presenting with tremors and visual hallucinations. We managed your withdrawal with intravenous and oral medications which have been tapered off. We recommend that you attend an alcohol cessation program to prevent recurrence of this condition. Our psychiatrists spoke to you and strongly encourage engagement in additional treatment for your alcohol use, including medication-assisted treatment and referral to substance rehab. Please follow up with your primary care provider in the next 2 weeks. If you experiencing increasing tremors, visual hallucinations, anxiety or palpitations, please return to the emergency room.

## 2022-02-25 NOTE — DISCHARGE NOTE NURSING/CASE MANAGEMENT/SOCIAL WORK - NSDCPEFALRISK_GEN_ALL_CORE
For information on Fall & Injury Prevention, visit: [email or web address] OR  [email or web address] OR  https://www.cdc.gov/steadi/patient.html

## 2022-02-25 NOTE — BH CONSULTATION LIAISON PROGRESS NOTE - NSBHADMITCOUNSEL_PSY_A_CORE
risks and benefits of treatment options/instructions for management, treatment and follow up/risk factor reduction/other...

## 2022-02-25 NOTE — PROGRESS NOTE ADULT - PROBLEM SELECTOR PLAN 1
Pt has a hx of [place] but unable to provide a timeline of when that occurred and what happened during the admission. [place] and [place] questions, unable to provide how long [place], said he was in [place], and can't recall the yesterday's date (2/22/2022). When asked about prior admissions, he says that he may have been intubated, but can't specifically state if he was in withdrawal or severe intoxication. Patient got 260mg phenobarbital, 50mg librium and 50mg valium at [facility]. NASIMA 3. S/p phenobarb prn's (670mg total) in MICU. Started on CIWA protocol cautiously with librium and ativan and stepped down to 7LA for monitoring, now stable for stepdown to RMF  - received 2mg ativan yesterday evening (unclear if CIWA really above 8)  - CIWA 1 yesterday night and later was sleeping comfortable  - librium weaned to 25mg PO daily  - c/w CIWA protocol    Plan:   - Ativan 2 mg IV PRN for breakthrough CIWA's greater than 8   - c/w folate 1g PO daily and multivitamins Pt has a hx of [place] withdrawal w/ [place] but unable to provide a timeline of when that occurred and what happened during the admission. [place] and [place], unable to provide how [place], said he was in [place], and can't recall the yesterday's date (2/22/2022). When asked about prior admissions, he says that he may have been intubated, but can't specifically state if he was in withdrawal or severe intoxication. Patient got 260mg phenobarbital, 50mg librium and 50mg valium at [facility]. NASIMA 3. S/p phenobarb prn's (670mg total) in MICU. Started on CIWA protocol cautiously with librium and ativan and stepped down to 7LA for monitoring, now stable for stepdown to RMF  - received 2mg ativan 2/23 evening  - CIWA 1 yesterday night and later was sleeping comfortable  - librium weaned to 25mg PO daily  - c/w CIWA protocol    Plan:   - Ativan 2 mg IV PRN for breakthrough CIWA's greater than 8   - c/w folate 1g PO daily and multivitamins

## 2022-02-25 NOTE — BH CONSULTATION LIAISON PROGRESS NOTE - OTHER
some confusion about events prior to hospitalization including concern for being "drugged" with cocaine, but not clear paranoia or delusions No SI or HI fair based on problematic alcohol use but seeking help attentive, mildly impaired concentration fair insight into alcohol use disorder, importance of seeking treatment stable posture seated in chair, gait not assessed grossly normal, some confusion about recent events prior to hospitalization

## 2022-03-02 DIAGNOSIS — F10.231 ALCOHOL DEPENDENCE WITH WITHDRAWAL DELIRIUM: ICD-10-CM

## 2022-03-02 DIAGNOSIS — K70.10 ALCOHOLIC HEPATITIS WITHOUT ASCITES: ICD-10-CM

## 2022-03-02 DIAGNOSIS — F10.24 ALCOHOL DEPENDENCE WITH ALCOHOL-INDUCED MOOD DISORDER: ICD-10-CM

## 2022-03-02 DIAGNOSIS — K76.0 FATTY (CHANGE OF) LIVER, NOT ELSEWHERE CLASSIFIED: ICD-10-CM

## 2022-03-02 DIAGNOSIS — E87.2 ACIDOSIS: ICD-10-CM

## 2022-03-02 DIAGNOSIS — E51.9 THIAMINE DEFICIENCY, UNSPECIFIED: ICD-10-CM

## 2022-03-02 DIAGNOSIS — R65.10 SYSTEMIC INFLAMMATORY RESPONSE SYNDROME (SIRS) OF NON-INFECTIOUS ORIGIN WITHOUT ACUTE ORGAN DYSFUNCTION: ICD-10-CM

## 2022-03-02 DIAGNOSIS — G31.2 DEGENERATION OF NERVOUS SYSTEM DUE TO ALCOHOL: ICD-10-CM

## 2022-03-02 DIAGNOSIS — F10.251 ALCOHOL DEPENDENCE WITH ALCOHOL-INDUCED PSYCHOTIC DISORDER WITH HALLUCINATIONS: ICD-10-CM

## 2022-03-02 DIAGNOSIS — D69.59 OTHER SECONDARY THROMBOCYTOPENIA: ICD-10-CM

## 2022-03-02 DIAGNOSIS — E86.0 DEHYDRATION: ICD-10-CM

## 2022-05-17 NOTE — ED PROVIDER NOTE - CRTICAL CARE TIME SPENT (MIN)
----- Message from Dakota Molina MD sent at 5/16/2022  4:21 PM EDT -----  Tell the patient that the lump removed was benign. Follow up prn. Sutures will dissolve.
I left message for him to call back.
Patient called back and was informed.
90

## 2022-10-06 NOTE — ED PROVIDER NOTE - ADMIT DISPOSITION PRESENT ON ADMISSION SEPSIS
Called patient.  No answer.  Left message for him to call back [phone].  He needs to bring x-ray copies with him to his appointment when one is made.  We need to know the date of injury and whether not he is splinted.  I will be calling [facility] to try and obtain the radiology report.  No appointment has been determine at this point.       Jesus Diop PA-C  [facility]  Hand    [address]  [address]  [place] [address]  Office Phone: [phone] if no answer call  Main Orthopedic Line: [phone]      Supervising Physician for this date and note.   Dr Mendez Menchaca     No

## 2023-02-21 NOTE — DISCHARGE NOTE PROVIDER - NSFOLLOWUPCLINICSTOKEN_GEN_ALL_ED_FT
02/21/23 1159   Reason for Consult   Reason for Consult Bedside activities;Therapeutic play;Other (comment)  (ongoing support of established pt)   Patient Intervention(s)   Type of Intervention Performed Normalizing and coping   Normalizing and Coping Intervention(s)   (writer and pt engaged with kenetic slime at bedside until pt transitioned to ultra sound)   Support Provided to Family   Support Provided to Family Parent/caregiver(s)   Parent/Caregiver's Name Grandmother   Anxiety Level   Anxiety Level No distress noted or observed   Evaluation   Patient Behaviors During Interventions Appropriate for age;Calm;Cooperative;Makes eye contact;Verbal;Interactive;Playful  (pt presented positive affect and easily engaged with writer)   Persons Present Family;Staff   Evaluation/Plan of Care Patient/family receptive;Provide ongoing support     Child Life services will remain available to provide support to patient and family throughout admission.     Kathie Fowler MS, CCLS  Certified Child Life Specialist  #     [id number]: || ||00\01||False;

## 2023-03-31 NOTE — PATIENT PROFILE ADULT - FALL HARM RISK - FACTORS
-- DO NOT REPLY / DO NOT REPLY ALL --  -- Message is from [facility] ([facility]) --    General Patient Message: patient returning phone call from [facility] , please call patient back    Caller Information       Type Contact Phone/Fax    03/31/2023 01:27 PM CDT Phone (Incoming) Hanh Hartman (Self) [phone] (M)        Alternative phone number: no    Can a detailed message be left? Yes    Message Turnaround:     Is it Working Hours? Yes - Working Hours     [place]:    Please give this turnaround time to the caller:   \"This message will be sent to [state Provider's name]. The clinical team will fulfill your request as soon as they review your message.\"                 CIWA protocol initiation less than 96 hours

## 2023-12-13 NOTE — ED PROVIDER NOTE - EKG ADDITIONAL QUESTION - PERFORMED INDEPENDENT VISUALIZATION
Yes Bleeding that does not stop/Swelling that gets worse/Fever greater than (need to indicate Fahrenheit or Celsius)/Numbness, tingling, color or temperature change to extremity/Nausea and vomiting that does not stop/Unable to urinate Bleeding that does not stop/Swelling that gets worse/Pain not relieved by Medications/Fever greater than (need to indicate Fahrenheit or Celsius)/Wound/Surgical Site with redness, or foul smelling discharge or pus/Numbness, tingling, color or temperature change to extremity/Nausea and vomiting that does not stop/Unable to urinate

## 2024-04-06 NOTE — PROGRESS NOTE ADULT - PROBLEM SELECTOR PLAN 4
Discharge instructions provided.    Likely 2/2 heavy alcohol use. Initially had drop in plt from 191 to 97, now downtrending. First initial drop in plt likely dilutional. Unclear why plt downtrending. Not on any medications that can cause low plt, no heparin, no hepatitis. Current number (60) may be closer to baseline plt.   - continue to trend  - monitor for signs of bleeding #Leukopenia  Likely 2/2 heavy alcohol use. Initially had drop in plt from 191 to 97, now downtrending. First initial drop in plt likely dilutional. Unclear why plt downtrending. Not on any medications that can cause low plt, no heparin, no hepatitis. Current number (60) may be closer to baseline plt. Presented with leukocytosis to 16 but now downtrended to 2.8.  - Blue top  - Hemolysis labs  - Hepatitis panel  - HIV  - continue to trend  - monitor for signs of bleeding #Leukopenia  Likely 2/2 heavy alcohol use. Initially had drop in plt from 191 to 97, now downtrending. First initial drop in plt likely dilutional. Unclear why plt downtrending. Not on any medications that can cause low plt, no heparin, no hepatitis. Current number (60) may be closer to baseline plt. Presented with leukocytosis to 16 but now downtrended to 2.8. Hepatitis panel wnl.  - Blue top  - Hemolysis labs  - HIV  - continue to trend  - monitor for signs of bleeding